# Patient Record
Sex: FEMALE | Race: WHITE | HISPANIC OR LATINO
[De-identification: names, ages, dates, MRNs, and addresses within clinical notes are randomized per-mention and may not be internally consistent; named-entity substitution may affect disease eponyms.]

---

## 2017-04-29 PROBLEM — Z00.00 ENCOUNTER FOR PREVENTIVE HEALTH EXAMINATION: Noted: 2017-04-29

## 2017-05-09 ENCOUNTER — APPOINTMENT (OUTPATIENT)
Dept: OBGYN | Facility: CLINIC | Age: 62
End: 2017-05-09

## 2017-08-13 ENCOUNTER — LABORATORY RESULT (OUTPATIENT)
Age: 62
End: 2017-08-13

## 2017-08-13 ENCOUNTER — RESULT REVIEW (OUTPATIENT)
Age: 62
End: 2017-08-13

## 2017-08-14 ENCOUNTER — APPOINTMENT (OUTPATIENT)
Dept: OBGYN | Facility: CLINIC | Age: 62
End: 2017-08-14
Payer: MEDICAID

## 2017-08-14 VITALS
HEIGHT: 62 IN | BODY MASS INDEX: 34.04 KG/M2 | DIASTOLIC BLOOD PRESSURE: 84 MMHG | WEIGHT: 185 LBS | SYSTOLIC BLOOD PRESSURE: 140 MMHG

## 2017-08-14 DIAGNOSIS — F41.9 ANXIETY DISORDER, UNSPECIFIED: ICD-10-CM

## 2017-08-14 DIAGNOSIS — I10 ESSENTIAL (PRIMARY) HYPERTENSION: ICD-10-CM

## 2017-08-14 DIAGNOSIS — F32.9 MAJOR DEPRESSIVE DISORDER, SINGLE EPISODE, UNSPECIFIED: ICD-10-CM

## 2017-08-14 DIAGNOSIS — F15.90 OTHER STIMULANT USE, UNSPECIFIED, UNCOMPLICATED: ICD-10-CM

## 2017-08-14 DIAGNOSIS — G40.909 EPILEPSY, UNSPECIFIED, NOT INTRACTABLE, W/OUT STATUS EPILEPTICUS: ICD-10-CM

## 2017-08-14 DIAGNOSIS — R23.2 FLUSHING: ICD-10-CM

## 2017-08-14 DIAGNOSIS — Z01.419 ENCOUNTER FOR GYNECOLOGICAL EXAMINATION (GENERAL) (ROUTINE) W/OUT ABNORMAL FINDINGS: ICD-10-CM

## 2017-08-14 DIAGNOSIS — Z86.79 PERSONAL HISTORY OF OTHER DISEASES OF THE CIRCULATORY SYSTEM: ICD-10-CM

## 2017-08-14 DIAGNOSIS — K29.70 GASTRITIS, UNSPECIFIED, W/OUT BLEEDING: ICD-10-CM

## 2017-08-14 PROCEDURE — 99386 PREV VISIT NEW AGE 40-64: CPT

## 2017-08-14 RX ORDER — ASPIRIN 325 MG/1
TABLET, FILM COATED ORAL
Refills: 0 | Status: ACTIVE | COMMUNITY

## 2017-08-14 RX ORDER — LACOSAMIDE 100 MG/1
100 TABLET, FILM COATED ORAL
Refills: 0 | Status: ACTIVE | COMMUNITY

## 2017-08-14 RX ORDER — PAROXETINE 7.5 MG/1
7.5 CAPSULE ORAL DAILY
Qty: 1 | Refills: 10 | Status: ACTIVE | COMMUNITY
Start: 2017-08-14 | End: 1900-01-01

## 2017-08-14 RX ORDER — DEXTROMETHORPHAN HYDROBROMIDE AND QUINIDINE SULFATE 20; 10 MG/1; MG/1
20-10 CAPSULE, GELATIN COATED ORAL
Refills: 0 | Status: ACTIVE | COMMUNITY

## 2017-08-14 RX ORDER — HYDROCHLOROTHIAZIDE 12.5 MG/1
CAPSULE ORAL
Refills: 0 | Status: ACTIVE | COMMUNITY

## 2017-08-14 RX ORDER — NIFEDIPINE 90 MG/1
90 TABLET, EXTENDED RELEASE ORAL
Refills: 0 | Status: ACTIVE | COMMUNITY

## 2017-08-14 RX ORDER — FLUTICASONE PROPIONATE 0.5 MG/G
0.05 CREAM TOPICAL
Qty: 60 | Refills: 0 | Status: ACTIVE | COMMUNITY
Start: 2017-06-12

## 2017-08-14 RX ORDER — LIDOCAINE AND PRILOCAINE 25; 25 MG/G; MG/G
2.5-2.5 CREAM TOPICAL
Qty: 30 | Refills: 0 | Status: ACTIVE | COMMUNITY
Start: 2017-05-30

## 2017-08-14 RX ORDER — AMMONIUM LACTATE 12 %
12 CREAM (GRAM) TOPICAL
Qty: 385 | Refills: 0 | Status: ACTIVE | COMMUNITY
Start: 2017-05-30

## 2017-08-28 ENCOUNTER — APPOINTMENT (OUTPATIENT)
Dept: OBGYN | Facility: CLINIC | Age: 62
End: 2017-08-28

## 2018-10-22 ENCOUNTER — INPATIENT (INPATIENT)
Facility: HOSPITAL | Age: 63
LOS: 16 days | Discharge: NURSING, REHAB OR LONG-TERM CARE | End: 2018-11-08
Attending: HOSPITALIST | Admitting: HOSPITALIST
Payer: MEDICAID

## 2018-10-22 VITALS
HEART RATE: 79 BPM | DIASTOLIC BLOOD PRESSURE: 83 MMHG | OXYGEN SATURATION: 99 % | TEMPERATURE: 98 F | SYSTOLIC BLOOD PRESSURE: 161 MMHG | RESPIRATION RATE: 18 BRPM

## 2018-10-22 DIAGNOSIS — Z90.49 ACQUIRED ABSENCE OF OTHER SPECIFIED PARTS OF DIGESTIVE TRACT: Chronic | ICD-10-CM

## 2018-10-22 NOTE — ED ADULT NURSE NOTE - CHIEF COMPLAINT QUOTE
PT C/O difficulty ambulating and unable to move right leg today. Pt [place] from [facility] [place] today. Pt has PMH 3 strokes, most recent being 3 months ago: Pt non verbal at baseline, right sided residual weakness and right sided facial droop at baseline: normally ambulatory with walker and assistance at baseline. CHETAN MD Wan in triage for stroke eval: no code stroke called.

## 2018-10-22 NOTE — ED ADULT NURSE NOTE - OBJECTIVE STATEMENT
Pt received to room 18. Presents alert straight from airport after landing from  According to friend at bedside brought pt to hospital after she was unable to move on arrival unsure of when symptoms began. Pt has right sided residual paralysis after stroke 3 months ago. History of multiple strokes in the past. Expressive aphasia at since stroke. Respirations appear even and unlabored. Will continue to monitor.

## 2018-10-22 NOTE — ED ADULT TRIAGE NOTE - CHIEF COMPLAINT QUOTE
PT C/O difficulty ambulating and unable to move right leg today. Pt arriving from [facility] [place] today. Pt has PMH 3 strokes, most recent being 3 months ago: Pt non verbal at baseline, right sided residual weakness and right sided facial droop at baseline: normally ambulatory with walker and assistance at baseline. CHETAN MD Wan in triage for stroke eval: no code stroke called.

## 2018-10-22 NOTE — ED ADULT NURSE NOTE - NSIMPLEMENTINTERV_GEN_ALL_ED
Implemented All Fall with Harm Risk Interventions:  [place] to call system. Call bell, personal items and telephone within reach. Instruct patient to call for assistance. Room bathroom lighting operational. Non-slip footwear when patient is off stretcher. Physically safe environment: no spills, clutter or unnecessary equipment. Stretcher in lowest position, wheels locked, appropriate side rails in place. Provide visual cue, wrist band, yellow gown, etc. Monitor gait and stability. Monitor for mental status changes and reorient to person, place, and time. Review medications for side effects contributing to fall risk. Reinforce activity limits and safety measures with patient and family. Provide visual clues: red socks.

## 2018-10-23 DIAGNOSIS — I63.9 CEREBRAL INFARCTION, UNSPECIFIED: ICD-10-CM

## 2018-10-23 DIAGNOSIS — I10 ESSENTIAL (PRIMARY) HYPERTENSION: ICD-10-CM

## 2018-10-23 DIAGNOSIS — R56.9 UNSPECIFIED CONVULSIONS: ICD-10-CM

## 2018-10-23 DIAGNOSIS — Z98.890 OTHER SPECIFIED POSTPROCEDURAL STATES: Chronic | ICD-10-CM

## 2018-10-23 DIAGNOSIS — Z29.9 ENCOUNTER FOR PROPHYLACTIC MEASURES, UNSPECIFIED: ICD-10-CM

## 2018-10-23 LAB
ALBUMIN SERPL ELPH-MCNC: 4.2 G/DL — SIGNIFICANT CHANGE UP (ref 3.3–5)
ALP SERPL-CCNC: 86 U/L — SIGNIFICANT CHANGE UP (ref 40–120)
ALT FLD-CCNC: 15 U/L — SIGNIFICANT CHANGE UP (ref 4–33)
APTT BLD: 27.6 SEC — SIGNIFICANT CHANGE UP (ref 27.5–37.4)
AST SERPL-CCNC: 15 U/L — SIGNIFICANT CHANGE UP (ref 4–32)
BASOPHILS # BLD AUTO: 0.05 K/UL — SIGNIFICANT CHANGE UP (ref 0–0.2)
BASOPHILS NFR BLD AUTO: 0.5 % — SIGNIFICANT CHANGE UP (ref 0–2)
BILIRUB SERPL-MCNC: 0.2 MG/DL — SIGNIFICANT CHANGE UP (ref 0.2–1.2)
BUN SERPL-MCNC: 13 MG/DL — SIGNIFICANT CHANGE UP (ref 7–23)
CALCIUM SERPL-MCNC: 9.6 MG/DL — SIGNIFICANT CHANGE UP (ref 8.4–10.5)
CHLORIDE SERPL-SCNC: 103 MMOL/L — SIGNIFICANT CHANGE UP (ref 98–107)
CO2 SERPL-SCNC: 24 MMOL/L — SIGNIFICANT CHANGE UP (ref 22–31)
CREAT SERPL-MCNC: 0.68 MG/DL — SIGNIFICANT CHANGE UP (ref 0.5–1.3)
EOSINOPHIL # BLD AUTO: 0.14 K/UL — SIGNIFICANT CHANGE UP (ref 0–0.5)
EOSINOPHIL NFR BLD AUTO: 1.3 % — SIGNIFICANT CHANGE UP (ref 0–6)
GLUCOSE SERPL-MCNC: 107 MG/DL — HIGH (ref 70–99)
HCT VFR BLD CALC: 42.1 % — SIGNIFICANT CHANGE UP (ref 34.5–45)
HGB BLD-MCNC: 13.9 G/DL — SIGNIFICANT CHANGE UP (ref 11.5–15.5)
IMM GRANULOCYTES # BLD AUTO: 0.08 # — SIGNIFICANT CHANGE UP
IMM GRANULOCYTES NFR BLD AUTO: 0.7 % — SIGNIFICANT CHANGE UP (ref 0–1.5)
INR BLD: 0.97 — SIGNIFICANT CHANGE UP (ref 0.88–1.17)
LYMPHOCYTES # BLD AUTO: 3.96 K/UL — HIGH (ref 1–3.3)
LYMPHOCYTES # BLD AUTO: 36 % — SIGNIFICANT CHANGE UP (ref 13–44)
MCHC RBC-ENTMCNC: 32.6 PG — SIGNIFICANT CHANGE UP (ref 27–34)
MCHC RBC-ENTMCNC: 33 % — SIGNIFICANT CHANGE UP (ref 32–36)
MCV RBC AUTO: 98.8 FL — SIGNIFICANT CHANGE UP (ref 80–100)
MONOCYTES # BLD AUTO: 1.08 K/UL — HIGH (ref 0–0.9)
MONOCYTES NFR BLD AUTO: 9.8 % — SIGNIFICANT CHANGE UP (ref 2–14)
NEUTROPHILS # BLD AUTO: 5.7 K/UL — SIGNIFICANT CHANGE UP (ref 1.8–7.4)
NEUTROPHILS NFR BLD AUTO: 51.7 % — SIGNIFICANT CHANGE UP (ref 43–77)
NRBC # FLD: 0 — SIGNIFICANT CHANGE UP
PLATELET # BLD AUTO: 244 K/UL — SIGNIFICANT CHANGE UP (ref 150–400)
PMV BLD: 10.9 FL — SIGNIFICANT CHANGE UP (ref 7–13)
POTASSIUM SERPL-MCNC: 4.1 MMOL/L — SIGNIFICANT CHANGE UP (ref 3.5–5.3)
POTASSIUM SERPL-SCNC: 4.1 MMOL/L — SIGNIFICANT CHANGE UP (ref 3.5–5.3)
PROT SERPL-MCNC: 7.6 G/DL — SIGNIFICANT CHANGE UP (ref 6–8.3)
PROTHROM AB SERPL-ACNC: 11.1 SEC — SIGNIFICANT CHANGE UP (ref 9.8–13.1)
RBC # BLD: 4.26 M/UL — SIGNIFICANT CHANGE UP (ref 3.8–5.2)
RBC # FLD: 11.9 % — SIGNIFICANT CHANGE UP (ref 10.3–14.5)
SODIUM SERPL-SCNC: 141 MMOL/L — SIGNIFICANT CHANGE UP (ref 135–145)
WBC # BLD: 11.01 K/UL — HIGH (ref 3.8–10.5)
WBC # FLD AUTO: 11.01 K/UL — HIGH (ref 3.8–10.5)

## 2018-10-23 PROCEDURE — 99223 1ST HOSP IP/OBS HIGH 75: CPT

## 2018-10-23 PROCEDURE — 99223 1ST HOSP IP/OBS HIGH 75: CPT | Mod: GC

## 2018-10-23 PROCEDURE — 70496 CT ANGIOGRAPHY HEAD: CPT | Mod: 26

## 2018-10-23 PROCEDURE — 70498 CT ANGIOGRAPHY NECK: CPT | Mod: 26

## 2018-10-23 RX ORDER — SPIRONOLACTONE 25 MG/1
25 TABLET, FILM COATED ORAL DAILY
Qty: 0 | Refills: 0 | Status: DISCONTINUED | OUTPATIENT
Start: 2018-10-23 | End: 2018-11-08

## 2018-10-23 RX ORDER — HEPARIN SODIUM 5000 [USP'U]/ML
5000 INJECTION INTRAVENOUS; SUBCUTANEOUS EVERY 8 HOURS
Qty: 0 | Refills: 0 | Status: DISCONTINUED | OUTPATIENT
Start: 2018-10-23 | End: 2018-11-08

## 2018-10-23 RX ORDER — NIFEDIPINE 30 MG
90 TABLET, EXTENDED RELEASE 24 HR ORAL DAILY
Qty: 0 | Refills: 0 | Status: DISCONTINUED | OUTPATIENT
Start: 2018-10-23 | End: 2018-10-23

## 2018-10-23 RX ORDER — HYDROCHLOROTHIAZIDE 25 MG
25 TABLET ORAL DAILY
Qty: 0 | Refills: 0 | Status: DISCONTINUED | OUTPATIENT
Start: 2018-10-23 | End: 2018-10-23

## 2018-10-23 RX ORDER — SPIRONOLACTONE 25 MG/1
25 TABLET, FILM COATED ORAL DAILY
Qty: 0 | Refills: 0 | Status: DISCONTINUED | OUTPATIENT
Start: 2018-10-23 | End: 2018-10-23

## 2018-10-23 RX ORDER — LACOSAMIDE 50 MG/1
100 TABLET ORAL
Qty: 0 | Refills: 0 | Status: DISCONTINUED | OUTPATIENT
Start: 2018-10-23 | End: 2018-10-25

## 2018-10-23 RX ORDER — SODIUM CHLORIDE 9 MG/ML
1000 INJECTION, SOLUTION INTRAVENOUS
Qty: 0 | Refills: 0 | Status: DISCONTINUED | OUTPATIENT
Start: 2018-10-23 | End: 2018-10-24

## 2018-10-23 RX ORDER — NIFEDIPINE 30 MG
90 TABLET, EXTENDED RELEASE 24 HR ORAL DAILY
Qty: 0 | Refills: 0 | Status: DISCONTINUED | OUTPATIENT
Start: 2018-10-23 | End: 2018-11-07

## 2018-10-23 RX ORDER — HYDROCHLOROTHIAZIDE 25 MG
25 TABLET ORAL DAILY
Qty: 0 | Refills: 0 | Status: DISCONTINUED | OUTPATIENT
Start: 2018-10-23 | End: 2018-11-01

## 2018-10-23 RX ADMIN — LACOSAMIDE 100 MILLIGRAM(S): 50 TABLET ORAL at 19:54

## 2018-10-23 RX ADMIN — SODIUM CHLORIDE 100 MILLILITER(S): 9 INJECTION, SOLUTION INTRAVENOUS at 21:29

## 2018-10-23 RX ADMIN — HEPARIN SODIUM 5000 UNIT(S): 5000 INJECTION INTRAVENOUS; SUBCUTANEOUS at 21:29

## 2018-10-23 RX ADMIN — HEPARIN SODIUM 5000 UNIT(S): 5000 INJECTION INTRAVENOUS; SUBCUTANEOUS at 13:19

## 2018-10-23 RX ADMIN — SODIUM CHLORIDE 100 MILLILITER(S): 9 INJECTION, SOLUTION INTRAVENOUS at 10:27

## 2018-10-23 NOTE — H&P ADULT - PROBLEM SELECTOR PLAN 1
subacute stroke with extensive left carotid dz.   Neurology following, f/u final recs  TTE  MRA following confirmation of compatibility of aneurysm clip with MR  check lipid panel, HbA1c  PT, swallow evaluation.  D5W while awaiting swallow eval. subacute stroke with extensive left carotid dz.   Neurology following, f/u final recs  MRA following confirmation of compatibility of aneurysm clip with MR  check lipid panel, HbA1c  PT, swallow evaluation.  D5W while awaiting swallow eval.

## 2018-10-23 NOTE — PHYSICAL THERAPY INITIAL EVALUATION ADULT - GENERAL OBSERVATIONS, REHAB EVAL
Patient found supine on stretcher in SURGE unit in NAD; +IV; as per ADINA Ruelas, patient with aphasia; non verbal; Icelandic speaking; responds to visual commands and simple commands in Icelandic; decreased coordination t/o

## 2018-10-23 NOTE — H&P ADULT - PROBLEM SELECTOR PLAN 3
h/o seizure? s/p ICH, maybe used as seziure prophylaxis  will need to confirm with Dr. Winkler and pharmacy.  c/w home seizure medication.

## 2018-10-23 NOTE — CONSULT NOTE ADULT - SUBJECTIVE AND OBJECTIVE BOX
patient is aphasic - unable to use      HPI:    63y f w PMHx strokes in the past p/w R sided paralysis s/p stroke. Pt had several stroke 15yrs ago and was apparently diagnosed w/ a brain aneurysm, which was stable for many years. She has been out of the country for the last year and 3 months ago had another stroke, which left her paralysed on the R upper and lower extremities and with R sided facial paralysis. No left sided deficits. [place] the [place] and her daughter picked her up from airport, not expecting the extent of neurological deficits. The pt was immediately brought to the ED by her daughter  for evaluation, as she did not know if additional medical care would be required and was not equipped at home to deal with a paralyzed guest.       MEDICATIONS  (STANDING):    MEDICATIONS  (PRN):      PAST MEDICAL & SURGICAL HISTORY:  S/P cholecystectomy      FAMILY HISTORY:      Allergies    No Known Allergies    Intolerances          SHx - No smoking, No ETOH, No drug abuse      Review of Systems:  CONSTITUTIONAL:  No weight loss, fever, chills, weakness or fatigue.  HEENT:  Eyes:  No visual loss, blurred vision, double vision or yellow sclerae. Ears, Nose, Throat:  No hearing loss, sneezing, congestion, runny nose or sore throat.  SKIN:  No rash or itching.  CARDIOVASCULAR:  No chest pain, chest pressure or chest discomfort. No palpitations or edema.  RESPIRATORY:  No shortness of breath, cough or sputum.  GASTROINTESTINAL:  No anorexia, nausea, vomiting or diarrhea. No abdominal pain or blood.  GENITOURINARY:  NO Burning on urination.   NEUROLOGICAL: See HPI  MUSCULOSKELETAL:  No muscle, back pain, joint pain or stiffness.  HEMATOLOGIC:  No anemia, bleeding or bruising.  LYMPHATICS:  No enlarged nodes. No history of splenectomy.  PSYCHIATRIC:  No history of depression or anxiety.  ENDOCRINOLOGIC:  No reports of sweating, cold or heat intolerance. No polyuria or polydipsia.  ALLERGIES:  No history of asthma, hives, eczema or rhinitis.        Vital Signs Last 24 Hrs  T(C): 36.8 (22 Oct 2018 23:35), Max: 36.8 (22 Oct 2018 23:35)  T(F): 98.2 (22 Oct 2018 23:35), Max: 98.2 (22 Oct 2018 23:35)  HR: 60 (23 Oct 2018 05:08) (57 - 79)  BP: 145/60 (23 Oct 2018 05:08) (145/60 - 167/70)  BP(mean): --  RR: 16 (23 Oct 2018 05:08) (16 - 18)  SpO2: 100% (23 Oct 2018 05:08) (98% - 100%)    General Exam:   General appearance: No acute distress                   Neurological Exam: limited     Mental Status: alert  awake, minimal verbal output - not making sense, following commands   Cranial Nerves: PERRL, EOMI, CN V1-3 intact to light touch and pinprick.  mild facial asymmetry- right , Tongue midline.    Motor:             Strength: 0/5 on right UE and RLE, 5/5 in LUE and LLE   Tremor: No resting, postural or action tremor.  No myoclonus.  Sensation: intact to light touch on left, intact on right but not reliable   Gait: deferred     Other:    10-22    141  |  103  |  13  ----------------------------<  107<H>  4.1   |  24  |  0.68    Ca    9.6      22 Oct 2018 23:30    TPro  7.6  /  Alb  4.2  /  TBili  0.2  /  DBili  x   /  AST  15  /  ALT  15  /  AlkPhos  86  10-22    10-22    141  |  103  |  13  ----------------------------<  107<H>  4.1   |  24  |  0.68    Ca    9.6      22 Oct 2018 23:30    TPro  7.6  /  Alb  4.2  /  TBili  0.2  /  DBili  x   /  AST  15  /  ALT  15  /  AlkPhos  86  10-22                          13.9   11.01 )-----------( 244      ( 22 Oct 2018 23:30 )             42.1       Radiology    CT head     < from: CT Angio Head w/ IV Cont (10.23.18 @ 02:35) >  CT brain: Post right pterional craniotomy with aneurysm clip in the right   parasellar region. Encephalomalacia in the right inferior frontal and   temporal lobes. Wide region of encephalomalacia in the left frontal lobe   and left basal ganglia likely reflective of an old infarct.  Small chronic appearing left parietal cortical infarct. No gross acute   intracranial hemorrhage or significant mass effect.    CT angiography neck: Completely occluded left internal carotid artery. No   significant stenosis of the cervical right internal carotid artery based   on NASCET criteria. Patent cervical vertebral arteries.    CT angiography brain: Reconstitution of the left internal carotid artery   at the cavernous and supraclinoid segments with trickle flow. Occluded   left anterior cerebral artery. Patent left middle cerebral artery M1 and   M2 inferior division. Occlusion of the superior division of the left   middle cerebral artery. Several poorly evaluated vessels secondary to   streak artifact from the aneurysm clip. MRA may be considered for further   evaluation.    < end of copied text >

## 2018-10-23 NOTE — H&P ADULT - NSHPPHYSICALEXAM_GEN_ALL_CORE
T(C): 36.8 (10-22-18 @ 23:35), Max: 36.8 (10-22-18 @ 23:35)  HR: 60 (10-23-18 @ 05:08) (57 - 79)  BP: 145/60 (10-23-18 @ 05:08) (145/60 - 167/70)  RR: 16 (10-23-18 @ 05:08) (16 - 18)  SpO2: 100% (10-23-18 @ 05:08) (98% - 100%)    GENERAL: no apparent distress, on room air  HEAD:  Atraumatic, Normocephalic  EYES: EOMI, PERRLA, conjunctiva and sclera clear b/l  CHEST/LUNG: Clear to auscultation bilaterally; No wheezing or crackles  HEART: Regular rate and rhythm; No murmurs, rubs, or gallops  ABDOMEN: Soft, Nontender, Nondistended; Bowel sounds present  EXTREMITIES:  pulses equal bilaterally; +pitting edema of RLE; non-tender on plapation.  PSYCH: normal affect, calm demeanor  NEUROLOGY: AAOX3, no sensory or motor deficits, 5/5 muscle strength equal in left extremities; 0/5 over right extremities, CN 2-12 grossly intact except for right lower facial droop, aphasia (vocal cord weakness?)  SKIN: No rashes or lesions

## 2018-10-23 NOTE — ED PROVIDER NOTE - ATTENDING CONTRIBUTION TO CARE
63F brought to ED by friends due to R hemiparesis and aphasia. Pt has h/o HTN ? p[rior sz and ? prior CVA. Was visiting [place] when she had a stroke 3 months ago. She finally flew back home to [place] today, was met at airport by friends who noted severe R hemiparesis and aphasia and brought her to ER stating they cannot care for her. On exam: Pt non verbal but able to follow commands. lungs and heart normal. Dense R hemiparesis with 1/5 weakness of R arm and leg with increased tone and reflexes, Some R facial droop. IMP: Stroke with dense R hemiparesis and aphasia likely months old, but not able to be cared for at home. d/w medicine and neuro- will need labs EKG CT head and CTA and admission to medicine for ultimate placement

## 2018-10-23 NOTE — PHYSICAL THERAPY INITIAL EVALUATION ADULT - PLANNED THERAPY INTERVENTIONS, PT EVAL
balance training/ROM/gait training/strengthening/bed mobility training/transfer training/Patient left supine on stretcher in NAD; +siderails; ADINA Ruelas aware

## 2018-10-23 NOTE — ED PROVIDER NOTE - MEDICAL DECISION MAKING DETAILS
Pt w/ chronic stroke symptoms now presenting unchanged; daughter requesting medical help and establishing neurologic f/u. No evidence of acute stroke symptoms, no code stroke called.

## 2018-10-23 NOTE — CONSULT NOTE ADULT - ASSESSMENT
- unable to use  as patient is aphasic.     63y f w PMHx strokes in the past p/w R sided paralysis s/p stroke. Pt had several stroke 15yrs ago and was apparently diagnosed w/ a brain aneurysm, which was stable for many years. She has been out of the country for the last year and 3 months ago had another stroke, which left her paralysed on the R upper and lower extremities and with R sided facial paralysis. No left sided deficits. [place] and her daughter picked her up from airport, not expecting the extent of neurological deficits. The pt was immediately brought to the ED by her daughter  for evaluation, as she did not know if additional medical care would be required and was not equipped at home to deal with a paralyzed guest. Neurological examination showed right facial asymmetry, right hemiplegia, aphasia. CT head showed chronic old infarcts in bilateral hemisphere.     Impression     b/l chronic infarct with encephalomalacia     Plan     not sure if clip is MRI compatible     (note is incomplete) - unable to use  as patient is aphasic.     63y f w PMHx strokes in the past p/w R sided paralysis s/p stroke. Pt had several stroke 15yrs ago and was apparently diagnosed w/ a brain aneurysm, which was stable for many years. She has been out of the country for the last year and 3 months ago had another stroke, which left her paralysed on the R upper and lower extremities and with R sided facial paralysis. No left sided deficits. [place] and her daughter picked her up from airport, not expecting the extent of neurological deficits. The pt was immediately brought to the ED by her daughter  for evaluation, as she did not know if additional medical care would be required and was not equipped at home to deal with a paralyzed guest. Neurological examination showed right facial asymmetry, right hemiplegia, aphasia. CT head showed chronic old infarcts in bilateral hemisphere.     Impression     b/l chronic infarct with encephalomalacia     Plan   Aneurysm clip may be MRI incompatible; need more information  -Aspirin 81 mg daily  -DVT prophylaxis  -CTA head/enck  -TTE with bubble study  -telemetry - unable to use  as patient is aphasic.     63y f w PMHx strokes in the past p/w R sided paralysis s/p stroke. Pt had several stroke 15yrs ago and was apparently diagnosed w/ a brain aneurysm, which was stable for many years. She has been out of the country for the last year and 3 months ago had another stroke, which left her paralysed on the R upper and lower extremities and with R sided facial paralysis. No left sided deficits. [place] and her daughter picked her up from airport, not expecting the extent of neurological deficits. The pt was immediately brought to the ED by her daughter  for evaluation, as she did not know if additional medical care would be required and was not equipped at home to deal with a paralyzed guest. Neurological examination showed right facial asymmetry, right hemiplegia, aphasia. CT head showed chronic old infarcts in bilateral hemisphere.     Impression     b/l chronic infarct with encephalomalacia     Plan   Aneurysm clip may be MRI incompatible; need more information  -Aspirin 81 mg daily  -DVT prophylaxis  -CTA head/enck  -TTE with bubble study  -atorvastatin 80 mg  -telemetry

## 2018-10-23 NOTE — PHYSICAL THERAPY INITIAL EVALUATION ADULT - PERTINENT HX OF CURRENT PROBLEM, REHAB EVAL
63F with PMHx HTN, ?Seizure, hemorrhagic stroke due to aneurysm s/p craniotomy presented with R sided weakness. As per chart, daughter Yamileth reports, [place], was [place] family in  for the past few months. 3 months ago, while aboard, patient suffered a stroke. Patient was found unconscious at home and when she woke up, she could not move the right side of body. CT head/neck old crani, left side infarct/princess enceph, complete occl LICA, occl left VINNY/sup left MCA.

## 2018-10-23 NOTE — H&P ADULT - HISTORY OF PRESENT ILLNESS
63F with PMHx HTN, ?Seizure, hemorrhagic stroke due to aneurysm s/p craniotomy presented with R sided weakness. Patient was non-verbal, information gathered from Daughter Yamileth. Per daughter, [place], was visiting family in  for the past few months. 3 months ago, while aboard, patient suffered from a stroke. Patient was found unconscious at home and when she woke up, she could not move the right side of her body. She brought her mother in for evaluation straight from the airport for evaluation, because she does not understand what happened and cannot take care of patient at home. Per Daughter, patient had a aneurysm rupture 12 years ago, with a prolonged ICU stay at the time. Patient had craniotomy with metal plate placed since. No residual deficit after the event. Patient had been following up with Dr. Winkler (neurologist in [facility]) since. Prior to this stroke, patient had been ambulatory and functional with full ADL. Per patient (via ), she had no fever, chill, chest pain, SOB, palpitation, abdominal pain or urinary complaints.    In ED, patient was afebrile and VSS. Labs were unremarkable. CT head and CTA head/neck showed old craniotomy, left sided infarct and bilateral encephalomalacias. Completely occluded left internal carotid artery.  Occluded left anterior cerebral artery. Occlusion of the superior division of the left middle cerebral artery. Neurology was consulted

## 2018-10-23 NOTE — H&P ADULT - NSHPREVIEWOFSYSTEMS_GEN_ALL_CORE
CONSTITUTIONAL: No fever, chills, night sweats, weight loss, or fatigue  EYES: No eye pain, blurry vision, or discharge  ENMT:  No difficulty hearing, tinnitus, vertigo; No sinus or throat pain  NECK: No pain or stiffness  BREASTS: No pain, masses, or nipple discharge  RESPIRATORY: No cough, wheezing, chills or hemoptysis; No shortness of breath  CARDIOVASCULAR: No chest pain, palpitations, dizziness, or leg swelling  GASTROINTESTINAL: No abdominal or epigastric pain. No nausea, vomiting, or hematemesis; No diarrhea or constipation. No melena or hematochezia.  GENITOURINARY: No dysuria, frequency, hematuria, or incontinence  NEUROLOGICAL: Cannot move right side of body; No headaches  SKIN: No itching, burning, rashes, or lesions   LYMPH NODES: No enlarged glands  ENDOCRINE: No heat or cold intolerance; No hair loss  MUSCULOSKELETAL: No joint pain or swelling; No muscle, back, or extremity pain

## 2018-10-23 NOTE — PHYSICAL THERAPY INITIAL EVALUATION ADULT - ACTIVE RANGE OF MOTION EXAMINATION, REHAB EVAL
Left LE Active ROM was WFL (within functional limits)/right UE and LE no AROM noted; PROM limited t/o at end range of motion t/o/Left UE Active ROM was WFL (within functional limits)

## 2018-10-23 NOTE — H&P ADULT - ASSESSMENT
63F with h/o HTN, hemorrhagic cva s/p craniotomy presented with right sided hemiplegia, aphasia. CTA with extensive left carotid artery dz. Admit for PT evaluation and rehab placement.

## 2018-10-23 NOTE — ED PROVIDER NOTE - OBJECTIVE STATEMENT
63y f w PMHx strokes in the past p/w R sided paralysis s/p stroke. Pt had several stroke 15yrs ago and was apparently diagnosed w/ a brain aneurysm, which was stable for many years. She has been out of the country for the last year and 3 months ago had another stroke, which left her paralysed on the R upper and lower extremities and with R sided facial paralysis. No left sided deficits. [place] and her daughter picked her up from airport, not expecting the extent of neurological deficits. The pt was immediately brought to the ED by her daughter  for evaluation, as she did not know if additional medical care would be required and was not equipped at home to deal with a paralyzed guest. Pt is otherwise A&Ox3

## 2018-10-24 LAB
ALBUMIN SERPL ELPH-MCNC: 3.6 G/DL — SIGNIFICANT CHANGE UP (ref 3.3–5)
ALP SERPL-CCNC: 83 U/L — SIGNIFICANT CHANGE UP (ref 40–120)
ALT FLD-CCNC: 13 U/L — SIGNIFICANT CHANGE UP (ref 4–33)
AST SERPL-CCNC: 13 U/L — SIGNIFICANT CHANGE UP (ref 4–32)
BASOPHILS # BLD AUTO: 0.03 K/UL — SIGNIFICANT CHANGE UP (ref 0–0.2)
BASOPHILS NFR BLD AUTO: 0.3 % — SIGNIFICANT CHANGE UP (ref 0–2)
BILIRUB SERPL-MCNC: 0.6 MG/DL — SIGNIFICANT CHANGE UP (ref 0.2–1.2)
BUN SERPL-MCNC: 10 MG/DL — SIGNIFICANT CHANGE UP (ref 7–23)
CALCIUM SERPL-MCNC: 9.3 MG/DL — SIGNIFICANT CHANGE UP (ref 8.4–10.5)
CHLORIDE SERPL-SCNC: 104 MMOL/L — SIGNIFICANT CHANGE UP (ref 98–107)
CHOLEST SERPL-MCNC: 163 MG/DL — SIGNIFICANT CHANGE UP (ref 120–199)
CO2 SERPL-SCNC: 22 MMOL/L — SIGNIFICANT CHANGE UP (ref 22–31)
CREAT SERPL-MCNC: 0.73 MG/DL — SIGNIFICANT CHANGE UP (ref 0.5–1.3)
EOSINOPHIL # BLD AUTO: 0.14 K/UL — SIGNIFICANT CHANGE UP (ref 0–0.5)
EOSINOPHIL NFR BLD AUTO: 1.5 % — SIGNIFICANT CHANGE UP (ref 0–6)
GLUCOSE SERPL-MCNC: 96 MG/DL — SIGNIFICANT CHANGE UP (ref 70–99)
HBA1C BLD-MCNC: 5.7 % — HIGH (ref 4–5.6)
HCT VFR BLD CALC: 39.5 % — SIGNIFICANT CHANGE UP (ref 34.5–45)
HDLC SERPL-MCNC: 34 MG/DL — LOW (ref 45–65)
HGB BLD-MCNC: 13.2 G/DL — SIGNIFICANT CHANGE UP (ref 11.5–15.5)
IMM GRANULOCYTES # BLD AUTO: 0.03 # — SIGNIFICANT CHANGE UP
IMM GRANULOCYTES NFR BLD AUTO: 0.3 % — SIGNIFICANT CHANGE UP (ref 0–1.5)
LIPID PNL WITH DIRECT LDL SERPL: 116 MG/DL — SIGNIFICANT CHANGE UP
LYMPHOCYTES # BLD AUTO: 3.16 K/UL — SIGNIFICANT CHANGE UP (ref 1–3.3)
LYMPHOCYTES # BLD AUTO: 34.6 % — SIGNIFICANT CHANGE UP (ref 13–44)
MAGNESIUM SERPL-MCNC: 2.1 MG/DL — SIGNIFICANT CHANGE UP (ref 1.6–2.6)
MCHC RBC-ENTMCNC: 33.3 PG — SIGNIFICANT CHANGE UP (ref 27–34)
MCHC RBC-ENTMCNC: 33.4 % — SIGNIFICANT CHANGE UP (ref 32–36)
MCV RBC AUTO: 99.7 FL — SIGNIFICANT CHANGE UP (ref 80–100)
MONOCYTES # BLD AUTO: 0.94 K/UL — HIGH (ref 0–0.9)
MONOCYTES NFR BLD AUTO: 10.3 % — SIGNIFICANT CHANGE UP (ref 2–14)
NEUTROPHILS # BLD AUTO: 4.82 K/UL — SIGNIFICANT CHANGE UP (ref 1.8–7.4)
NEUTROPHILS NFR BLD AUTO: 53 % — SIGNIFICANT CHANGE UP (ref 43–77)
NRBC # FLD: 0 — SIGNIFICANT CHANGE UP
PHOSPHATE SERPL-MCNC: 4.4 MG/DL — SIGNIFICANT CHANGE UP (ref 2.5–4.5)
PLATELET # BLD AUTO: 208 K/UL — SIGNIFICANT CHANGE UP (ref 150–400)
PMV BLD: 11 FL — SIGNIFICANT CHANGE UP (ref 7–13)
POTASSIUM SERPL-MCNC: 3.8 MMOL/L — SIGNIFICANT CHANGE UP (ref 3.5–5.3)
POTASSIUM SERPL-SCNC: 3.8 MMOL/L — SIGNIFICANT CHANGE UP (ref 3.5–5.3)
PROT SERPL-MCNC: 6.7 G/DL — SIGNIFICANT CHANGE UP (ref 6–8.3)
RBC # BLD: 3.96 M/UL — SIGNIFICANT CHANGE UP (ref 3.8–5.2)
RBC # FLD: 11.8 % — SIGNIFICANT CHANGE UP (ref 10.3–14.5)
SODIUM SERPL-SCNC: 140 MMOL/L — SIGNIFICANT CHANGE UP (ref 135–145)
TRIGL SERPL-MCNC: 115 MG/DL — SIGNIFICANT CHANGE UP (ref 10–149)
TSH SERPL-MCNC: 2.2 UIU/ML — SIGNIFICANT CHANGE UP (ref 0.27–4.2)
WBC # BLD: 9.12 K/UL — SIGNIFICANT CHANGE UP (ref 3.8–10.5)
WBC # FLD AUTO: 9.12 K/UL — SIGNIFICANT CHANGE UP (ref 3.8–10.5)

## 2018-10-24 PROCEDURE — 99233 SBSQ HOSP IP/OBS HIGH 50: CPT

## 2018-10-24 PROCEDURE — 93880 EXTRACRANIAL BILAT STUDY: CPT | Mod: 26

## 2018-10-24 RX ORDER — ATORVASTATIN CALCIUM 80 MG/1
80 TABLET, FILM COATED ORAL AT BEDTIME
Qty: 0 | Refills: 0 | Status: DISCONTINUED | OUTPATIENT
Start: 2018-10-24 | End: 2018-11-08

## 2018-10-24 RX ORDER — ASPIRIN/CALCIUM CARB/MAGNESIUM 324 MG
81 TABLET ORAL DAILY
Qty: 0 | Refills: 0 | Status: DISCONTINUED | OUTPATIENT
Start: 2018-10-24 | End: 2018-11-08

## 2018-10-24 RX ADMIN — HEPARIN SODIUM 5000 UNIT(S): 5000 INJECTION INTRAVENOUS; SUBCUTANEOUS at 07:50

## 2018-10-24 RX ADMIN — SODIUM CHLORIDE 100 MILLILITER(S): 9 INJECTION, SOLUTION INTRAVENOUS at 07:52

## 2018-10-24 RX ADMIN — HEPARIN SODIUM 5000 UNIT(S): 5000 INJECTION INTRAVENOUS; SUBCUTANEOUS at 22:00

## 2018-10-24 RX ADMIN — ATORVASTATIN CALCIUM 80 MILLIGRAM(S): 80 TABLET, FILM COATED ORAL at 22:00

## 2018-10-24 RX ADMIN — Medication 81 MILLIGRAM(S): at 13:26

## 2018-10-24 RX ADMIN — LACOSAMIDE 100 MILLIGRAM(S): 50 TABLET ORAL at 17:50

## 2018-10-24 RX ADMIN — HEPARIN SODIUM 5000 UNIT(S): 5000 INJECTION INTRAVENOUS; SUBCUTANEOUS at 14:47

## 2018-10-24 NOTE — SWALLOW BEDSIDE ASSESSMENT ADULT - ORAL PHASE
Delayed anterior-posterior transfer Suspect premature loss over base of tongue given rapid AP transport

## 2018-10-24 NOTE — CONSULT NOTE ADULT - ASSESSMENT
64 yo woman with a hx of HTN, possible seizure, hemorrhagic stroke due to aneurysm rupture 12 years ago s/p clipping and craniotomy, who presents with completed left-sided CVA, right-sided hemiplegia, and complete occlusion of left ICA on CTA. Unknown if patient's previous clipping and craniotomy hardware are MRI safe, so patient cannot undergo MRA. Recommend checking carotid duplex to confirm complete left ICA occlusion. If this is indeed the case, she would not benefit from any intervention as she has already developed collateral circulation.    -recommend carotid duplex to confirm complete L ICA occlusion  -cont medical management of carotid disease: ASA, statin, BP and glucose control  -vascular surgery will continue to follow    Patient discussed with Dr. rPatt  [id number] 64 yo woman with a hx of HTN, possible seizure, hemorrhagic stroke due to aneurysm rupture 12 years ago s/p clipping and craniotomy, who presents with completed left-sided CVA, right-sided hemiplegia, and complete occlusion of left ICA on CTA. Unknown if patient's previous clipping and craniotomy hardware are MRI safe, so patient cannot undergo MRA. VA Duplex confirming complete L ICA occlusion. As such, she would not benefit from any intervention as she has already developed collateral circulation.    -no vascular surgical intervention indicated  -cont medical management of carotid disease: ASA, statin, BP and glucose control  -please reconsult should additional concerns arise    Patient discussed with Dr. Pratt  [id number]

## 2018-10-24 NOTE — SWALLOW BEDSIDE ASSESSMENT ADULT - COMMENTS
Patient was received awake, alert and non-verbal. She inconsistently utilized gestures (i.e. head nod) to respond to basic yes/no questions Patient is a 63 year-old female with PMHx of HTN, hemorrhagic cva, brain aneurysm s/p craniotomy, metal plate clip (~12 years ago), who presented with subacute / chronic CVA with right sided hemiplegia and aphasia. CTA with extensive left carotid artery disease.     Patient was received awake, alert and non-verbal. She was noted to utilize gestures (i.e. head nod) to respond to basic yes/no questions, and followed simple one-step commands with clinician repetition and models. Recommendations discussed with RN and ADS. Patient is a 63 year-old female with PMHx of HTN, hemorrhagic cva, brain aneurysm s/p craniotomy, metal plate clip (~12 years ago), who presented with subacute / chronic CVA with right sided hemiplegia and aphasia. CTA with extensive left carotid artery disease.     Patient was received awake, alert and non-verbal this AM. She was noted to utilize gestures (i.e. head nod) to respond to basic yes/no questions, and followed simple one-step commands with clinician repetition and models. Recommendations discussed with RN and ADS.

## 2018-10-24 NOTE — CONSULT NOTE ADULT - SUBJECTIVE AND OBJECTIVE BOX
CONSULT RESULT - VASCULAR SURGERY    Consulting attending: Dr. Pratt    HPI:  62 yo woman with a hx of HTN, possible seizure, hemorrhagic stroke due to aneurysm rupture 12 years ago s/p clipping and craniotomy, who presents with right-sided hemiplegia. The patient just arrived from the [place] where she had been for 3 months. Unknown chronicity of hemiplegia. The patient is aphasic and unable to participate in interview.    PAST MEDICAL HISTORY:  Cerebral aneurysm rupture  Aneurysm  Hemorrhagic stroke  Seizure  Hypertension, unspecified type      PAST SURGICAL HISTORY:  H/O craniotomy  S/P cholecystectomy      MEDICATIONS:  aspirin enteric coated 81 milliGRAM(s) Oral daily  atorvastatin 80 milliGRAM(s) Oral at bedtime  heparin  Injectable 5000 Unit(s) SubCutaneous every 8 hours  hydrochlorothiazide 25 milliGRAM(s) Oral daily  lacosamide 100 milliGRAM(s) Oral two times a day  NIFEdipine XL 90 milliGRAM(s) Oral daily  spironolactone 25 milliGRAM(s) Oral daily      ALLERGIES:  No Known Allergies      VITALS & I/Os:  Vital Signs Last 24 Hrs  T(C): 37.1 (24 Oct 2018 06:29), Max: 37.1 (24 Oct 2018 06:29)  T(F): 98.8 (24 Oct 2018 06:29), Max: 98.8 (24 Oct 2018 06:29)  HR: 62 (24 Oct 2018 06:29) (61 - 62)  BP: 137/84 (24 Oct 2018 06:29) (135/81 - 145/80)  BP(mean): --  RR: 18 (24 Oct 2018 06:29) (18 - 18)  SpO2: 99% (24 Oct 2018 06:29) (99% - 100%)    I&O's Summary    23 Oct 2018 07:01  -  24 Oct 2018 07:00  --------------------------------------------------------  IN: 700 mL / OUT: 0 mL / NET: 700 mL    24 Oct 2018 07:01  -  24 Oct 2018 14:22  --------------------------------------------------------  IN: 1200 mL / OUT: 0 mL / NET: 1200 mL        PHYSICAL EXAM:  GEN: NAD, resting quietly  HEENT: NCAT, left facial droop  NEURO: alert, aphasic. Strength 0/5 in RUE and RLE. Strength 5/5 in LUE and LLE  PULM: symmetric chest rise bilaterally, no increased WOB  CV: regular rate, peripheral pulses intact  ABD: soft, NTND  EXTR: no cyanosis or edema    LABS:                        13.2   9.12  )-----------( 208      ( 24 Oct 2018 06:19 )             39.5     10-24    140  |  104  |  10  ----------------------------<  96  3.8   |  22  |  0.73    Ca    9.3      24 Oct 2018 06:19  Phos  4.4     10-24  Mg     2.1     10-24    TPro  6.7  /  Alb  3.6  /  TBili  0.6  /  DBili  x   /  AST  13  /  ALT  13  /  AlkPhos  83  10-24    Lactate:    PT/INR - ( 22 Oct 2018 23:33 )   PT: 11.1 SEC;   INR: 0.97          PTT - ( 22 Oct 2018 23:33 )  PTT:27.6 SEC      IMAGING:  CT Angio Head and Neck (10/23):  CT brain: Post right pterional craniotomy with aneurysm clip in the right   parasellar region. Encephalomalacia in the right inferior frontal and   temporal lobes. Wide region of encephalomalacia in the left frontal lobe   and left basal ganglia likely reflective of an old infarct.  Small chronic appearing left parietal cortical infarct. No gross acute   intracranial hemorrhage or significant mass effect.    CT angiography neck: Completely occluded left internal carotid artery. No   significant stenosis of the cervical right internal carotid artery based   on NASCET criteria. Patent cervical vertebral arteries.    CT angiography brain: Reconstitution of the left internal carotid artery   at the cavernous and supraclinoid segments with trickle flow. Occluded   left anterior cerebral artery. Patent left middle cerebral artery M1 and   M2 inferior division. Occlusion of the superior division of the left   middle cerebral artery. Several poorly evaluated vessels secondary to   streak artifact from the aneurysm clip. MRA may be considered for further   evaluation. CONSULT RESULT - VASCULAR SURGERY    Consulting attending: Dr. Pratt    HPI:  64 yo woman with a hx of HTN, possible seizure, hemorrhagic stroke due to aneurysm rupture 12 years ago s/p clipping and craniotomy, who presents with right-sided hemiplegia. The patient just arrived from the [place] where she had been for 3 months. Unknown chronicity of hemiplegia. The patient is aphasic and unable to participate in interview.    PAST MEDICAL HISTORY:  Cerebral aneurysm rupture  Aneurysm  Hemorrhagic stroke  Seizure  Hypertension, unspecified type      PAST SURGICAL HISTORY:  H/O craniotomy  S/P cholecystectomy      MEDICATIONS:  aspirin enteric coated 81 milliGRAM(s) Oral daily  atorvastatin 80 milliGRAM(s) Oral at bedtime  heparin  Injectable 5000 Unit(s) SubCutaneous every 8 hours  hydrochlorothiazide 25 milliGRAM(s) Oral daily  lacosamide 100 milliGRAM(s) Oral two times a day  NIFEdipine XL 90 milliGRAM(s) Oral daily  spironolactone 25 milliGRAM(s) Oral daily      ALLERGIES:  No Known Allergies      VITALS & I/Os:  Vital Signs Last 24 Hrs  T(C): 37.1 (24 Oct 2018 06:29), Max: 37.1 (24 Oct 2018 06:29)  T(F): 98.8 (24 Oct 2018 06:29), Max: 98.8 (24 Oct 2018 06:29)  HR: 62 (24 Oct 2018 06:29) (61 - 62)  BP: 137/84 (24 Oct 2018 06:29) (135/81 - 145/80)  BP(mean): --  RR: 18 (24 Oct 2018 06:29) (18 - 18)  SpO2: 99% (24 Oct 2018 06:29) (99% - 100%)    I&O's Summary    23 Oct 2018 07:01  -  24 Oct 2018 07:00  --------------------------------------------------------  IN: 700 mL / OUT: 0 mL / NET: 700 mL    24 Oct 2018 07:01  -  24 Oct 2018 14:22  --------------------------------------------------------  IN: 1200 mL / OUT: 0 mL / NET: 1200 mL        PHYSICAL EXAM:  GEN: NAD, resting quietly  HEENT: NCAT, left facial droop  NEURO: alert, aphasic. Strength 0/5 in RUE and RLE. Strength 5/5 in LUE and LLE  PULM: symmetric chest rise bilaterally, no increased WOB  CV: regular rate, peripheral pulses intact  ABD: soft, NTND  EXTR: no cyanosis or edema    LABS:                        13.2   9.12  )-----------( 208      ( 24 Oct 2018 06:19 )             39.5     10-24    140  |  104  |  10  ----------------------------<  96  3.8   |  22  |  0.73    Ca    9.3      24 Oct 2018 06:19  Phos  4.4     10-24  Mg     2.1     10-24    TPro  6.7  /  Alb  3.6  /  TBili  0.6  /  DBili  x   /  AST  13  /  ALT  13  /  AlkPhos  83  10-24    Lactate:    PT/INR - ( 22 Oct 2018 23:33 )   PT: 11.1 SEC;   INR: 0.97          PTT - ( 22 Oct 2018 23:33 )  PTT:27.6 SEC      IMAGING:  CT Angio Head and Neck (10/23):  CT brain: Post right pterional craniotomy with aneurysm clip in the right   parasellar region. Encephalomalacia in the right inferior frontal and   temporal lobes. Wide region of encephalomalacia in the left frontal lobe   and left basal ganglia likely reflective of an old infarct.  Small chronic appearing left parietal cortical infarct. No gross acute   intracranial hemorrhage or significant mass effect.    CT angiography neck: Completely occluded left internal carotid artery. No   significant stenosis of the cervical right internal carotid artery based   on NASCET criteria. Patent cervical vertebral arteries.    CT angiography brain: Reconstitution of the left internal carotid artery   at the cavernous and supraclinoid segments with trickle flow. Occluded   left anterior cerebral artery. Patent left middle cerebral artery M1 and   M2 inferior division. Occlusion of the superior division of the left   middle cerebral artery. Several poorly evaluated vessels secondary to   streak artifact from the aneurysm clip. MRA may be considered for further   evaluation.    VA Duplex Carotid Arteries, Bilateral (10/24)  Summary/Impressions:  1. Left Internal Carotid Artery: Completely occluded. No  flow noted on color and spectral Doppler analyses.  2. Right Internal Carotid Artery:  There is mild  heterogenous plaque within the proximal vessel, consistent  with a 16-49% stenosis. No hemodynamically significant  stenosis.

## 2018-10-24 NOTE — SWALLOW BEDSIDE ASSESSMENT ADULT - SWALLOW EVAL: DIAGNOSIS
Patient demonstrates oropharyngeal dysphagia characterized by reduced labial seal with subsequent anterior loss of thin liquid from right side of oral cavity, delayed bolus collection, delayed anterior-posterior transfer, and suspected premature spillage of thinner liquid viscosities (i.e. thin/nectar) over base of tongue. Pharyngeal stage was marked by a suspected delay in pharyngeal trigger with reduced hyolaryngeal excursion upon digital palpation. Multiple swallows were exhibited noted with nectar-thick and thin liquid trials suggestive of pharyngeal stasis vs laryngeal penetration/aspiration. No overt, clinical s/s noted with puree and honey-thick liquids. Patient demonstrates oropharyngeal dysphagia characterized by reduced labial seal with subsequent anterior loss of thin liquid from right side of oral cavity, delayed bolus collection, delayed anterior-posterior transfer, and suspected premature spillage of thinner liquid viscosities (i.e. thin/nectar) over base of tongue. Pharyngeal stage was marked by a suspected delay in pharyngeal trigger with reduced hyolaryngeal excursion upon digital palpation. Multiple swallows were palpated with nectar-thick and thin liquid trials suggestive of pharyngeal stasis vs laryngeal penetration/aspiration. No overt, clinical s/s noted with puree and honey-thick liquids.

## 2018-10-24 NOTE — SWALLOW BEDSIDE ASSESSMENT ADULT - SWALLOW EVAL: RECOMMENDED FEEDING/EATING TECHNIQUES
maintain upright posture during/after eating for 30 mins/allow for swallow between intakes/crush medication (when feasible)/no straws/position upright (90 degrees)/small sips/bites

## 2018-10-24 NOTE — PROGRESS NOTE ADULT - PROBLEM SELECTOR PLAN 1
subacute stroke with extensive left carotid dz.   dw daughter Yamileth today, clip was placed 12 years ao in a hospital in [place], she does not know the name or the doctor o0r hospital name. she doesn't think pt ever had an MRI. dw MRI supervisor as well, as we can not confirm compatibility of clip unable to do MR brain. dw neuro, MR will not  in terms of stroke as appears to be due to carotid disease. ordered TTE with bubble, will have vascular consult for endarterectomy eval. no need for Tele per neuro. per daughter, pt was most likely on ASA, will start 81mg daily, and start Statin with low HDL and LDL > 100. pre DM2, tight diet control, follow up swallow eval, likely for acute rehab, will have PMR eval subacute stroke with extensive left carotid dz.   dw daughter Yamileth today, clip was placed 12 years ago in a hospital in [place], she does not know the name of the doctor or hospital. she doesn't think pt ever had an MRI. dw MRI supervisor as well, as we cannot confirm compatibility of clip unable to do MR brain. dw neuro, MR will not  in terms of stroke as subacute and appears to be due to carotid disease. ordered TTE with bubble, will have vascular consult for endarterectomy eval. no need for Tele per neuro. per daughter, pt was most likely on ASA, so will start 81mg daily, and start Statin with low HDL and LDL > 100. pre DM2, tight diet control, follow up swallow eval, likely for acute rehab, will have PMR eval subacute stroke with extensive left carotid dz.   dw daughter Yamileth today, clip was placed 12 years ago in a hospital in [place], she does not know the name of the doctor or hospital. she doesn't think pt ever had an MRI. dw MRI supervisor as well, as we cannot confirm compatibility of clip unable to do MR brain. dw neuro, MR will not  in terms of stroke as subacute and appears to be due to carotid disease. in the meantime, contacted pt's out-pt neurologist's office Dr Winkler to obtain collateral, awaiting call back. ordered TTE with bubble, will have vascular consult for endarterectomy eval. no need for Tele per neuro. per daughter, pt was most likely on ASA, so will start 81mg daily, and start Statin with low HDL and LDL > 100. pre DM2, tight diet control, follow up swallow eval, likely for acute rehab, will have PMR eval

## 2018-10-24 NOTE — PROGRESS NOTE ADULT - SUBJECTIVE AND OBJECTIVE BOX
Patient is a 63y old  Female who presents with a chief complaint of unable to ambulate (23 Oct 2018 09:35)      SUBJECTIVE / OVERNIGHT EVENTS: aphasic, only able to nod to questions, nods no to pain. no events otherwise     ROS:  unable to assess due to aphasia     MEDICATIONS  (STANDING):  dextrose 5% + sodium chloride 0.45%. 1000 milliLiter(s) (100 mL/Hr) IV Continuous <Continuous>  heparin  Injectable 5000 Unit(s) SubCutaneous every 8 hours  hydrochlorothiazide 25 milliGRAM(s) Oral daily  lacosamide 100 milliGRAM(s) Oral two times a day  NIFEdipine XL 90 milliGRAM(s) Oral daily  spironolactone 25 milliGRAM(s) Oral daily    MEDICATIONS  (PRN):      T(C): 37.1 (10-24-18 @ 06:29)  HR: 62 (10-24-18 @ 06:29)  BP: 137/84 (10-24-18 @ 06:29)  RR: 18 (10-24-18 @ 06:29)  SpO2: 99% (10-24-18 @ 06:29)  CAPILLARY BLOOD GLUCOSE      POCT Blood Glucose.: 106 mg/dL (24 Oct 2018 08:58)    I&O's Summary    23 Oct 2018 07:01  -  24 Oct 2018 07:00  --------------------------------------------------------  IN: 700 mL / OUT: 0 mL / NET: 700 mL    24 Oct 2018 07:01  -  24 Oct 2018 11:20  --------------------------------------------------------  IN: 1200 mL / OUT: 0 mL / NET: 1200 mL        PHYSICAL EXAM:  GENERAL: NAD, alert, aphasic   HEAD:  Atraumatic, Normocephalic  EYES: EOMI, PERRL, conjunctiva and sclera clear  NECK: Supple, No JVD  CHEST/LUNG: Clear to auscultation bilaterally  HEART: Regular rate and rhythm; No murmurs, rubs, or gallops, No Edema  ABDOMEN: Soft, Nontender, Nondistended; Bowel sounds present  EXTREMITIES:  2+ Peripheral Pulses, No clubbing, cyanosis  PSYCH: No SI/HI  NEUROLOGY: R sided paresis, r facial asymmetry   SKIN: No rashes or lesions    LABS:                        13.2   9.12  )-----------( 208      ( 24 Oct 2018 06:19 )             39.5     10-24    140  |  104  |  10  ----------------------------<  96  3.8   |  22  |  0.73    Ca    9.3      24 Oct 2018 06:19  Phos  4.4     10-24  Mg     2.1     10-24    TPro  6.7  /  Alb  3.6  /  TBili  0.6  /  DBili  x   /  AST  13  /  ALT  13  /  AlkPhos  83  10-24    PT/INR - ( 22 Oct 2018 23:33 )   PT: 11.1 SEC;   INR: 0.97          PTT - ( 22 Oct 2018 23:33 )  PTT:27.6 SEC              RADIOLOGY & ADDITIONAL TESTS:    Imaging Personally Reviewed: CT A - occlusion L ICA     Consultant(s) Notes Reviewed:      Care Discussed with Consultants/Other Providers: neuro resident

## 2018-10-24 NOTE — CHART NOTE - NSCHARTNOTEFT_GEN_A_CORE
Patient is a 63y old  Female who presents with a chief complaint of unable to ambulate (24 Oct 2018 11:20). Hx of stroke about 3 months ago. consulted PM&R, will f/u cs.

## 2018-10-24 NOTE — SWALLOW BEDSIDE ASSESSMENT ADULT - PHARYNGEAL PHASE
Delayed pharyngeal swallow/Decreased laryngeal elevation Multiple swallows/Delayed pharyngeal swallow/Decreased laryngeal elevation

## 2018-10-25 PROCEDURE — 99233 SBSQ HOSP IP/OBS HIGH 50: CPT

## 2018-10-25 PROCEDURE — 74230 X-RAY XM SWLNG FUNCJ C+: CPT | Mod: 26

## 2018-10-25 RX ORDER — LACOSAMIDE 50 MG/1
100 TABLET ORAL
Qty: 0 | Refills: 0 | Status: DISCONTINUED | OUTPATIENT
Start: 2018-10-25 | End: 2018-11-01

## 2018-10-25 RX ADMIN — LACOSAMIDE 100 MILLIGRAM(S): 50 TABLET ORAL at 17:53

## 2018-10-25 RX ADMIN — Medication 90 MILLIGRAM(S): at 05:57

## 2018-10-25 RX ADMIN — Medication 25 MILLIGRAM(S): at 05:58

## 2018-10-25 RX ADMIN — HEPARIN SODIUM 5000 UNIT(S): 5000 INJECTION INTRAVENOUS; SUBCUTANEOUS at 12:43

## 2018-10-25 RX ADMIN — HEPARIN SODIUM 5000 UNIT(S): 5000 INJECTION INTRAVENOUS; SUBCUTANEOUS at 21:53

## 2018-10-25 RX ADMIN — ATORVASTATIN CALCIUM 80 MILLIGRAM(S): 80 TABLET, FILM COATED ORAL at 21:53

## 2018-10-25 RX ADMIN — LACOSAMIDE 100 MILLIGRAM(S): 50 TABLET ORAL at 06:03

## 2018-10-25 RX ADMIN — Medication 81 MILLIGRAM(S): at 11:16

## 2018-10-25 RX ADMIN — HEPARIN SODIUM 5000 UNIT(S): 5000 INJECTION INTRAVENOUS; SUBCUTANEOUS at 05:58

## 2018-10-25 RX ADMIN — SPIRONOLACTONE 25 MILLIGRAM(S): 25 TABLET, FILM COATED ORAL at 05:58

## 2018-10-25 NOTE — PROGRESS NOTE ADULT - PROBLEM SELECTOR PLAN 1
subacute stroke with extensive left carotid dz.   dw daughter Yamileth, clip was placed 12 years ago in a hospital in [place], she does not know the name of the doctor or hospital. she doesn't think pt ever had an MRI. dw MRI supervisor as well, as we cannot confirm compatibility of clip unable to do MR brain. dw neuro, MR will not  in terms of stroke as subacute and appears to be due to carotid disease. in the meantime, contacted pt's out-pt neurologist's office Dr Winkler to obtain collateral, awaiting call back. ordered TTE with bubble - will check w neuro if needed in setting of known carotid disease. Saeed vascular consult - as complete L carotid occlusion and presence of collateral flow, no indication for intervention, cw medical management. no need for Tele per neuro. per daughter, pt was most likely on ASA, so started 81mg daily, and high intensity Statin in setting of low HDL and LDL > 100. pre DM2, tight diet control. saeed swallow eval, on dysphagia diet, pending cineesophagogram. for likely acute rehab, PMR eval called

## 2018-10-25 NOTE — SWALLOW VFSS/MBS ASSESSMENT ADULT - ORAL PHASE
within functional limits Delayed oral transit time/Reduced anterior - posterior transport Within functional limits

## 2018-10-25 NOTE — PROGRESS NOTE ADULT - SUBJECTIVE AND OBJECTIVE BOX
Patient is a 63y old  Female who presents with a chief complaint of unable to ambulate (24 Oct 2018 14:21)      SUBJECTIVE / OVERNIGHT EVENTS: pt appears frustrated that she is unable to communicate. I asked her if she is in pain she nods no, I asked her to write down what's on her mind, she is unable to due to weakness. I told her that I am in touch with her sister and it seemed to make her feel calmer. otherwise, no events no new weakness noted     ROS:  unable to assess due to aphasia     MEDICATIONS  (STANDING):  aspirin enteric coated 81 milliGRAM(s) Oral daily  atorvastatin 80 milliGRAM(s) Oral at bedtime  heparin  Injectable 5000 Unit(s) SubCutaneous every 8 hours  hydrochlorothiazide 25 milliGRAM(s) Oral daily  lacosamide 100 milliGRAM(s) Oral two times a day  NIFEdipine XL 90 milliGRAM(s) Oral daily  spironolactone 25 milliGRAM(s) Oral daily    MEDICATIONS  (PRN):      T(C): 37.2 (10-25-18 @ 10:49)  HR: 68 (10-25-18 @ 10:49)  BP: 119/78 (10-25-18 @ 10:49)  RR: 16 (10-25-18 @ 10:49)  SpO2: 95% (10-25-18 @ 10:49)  CAPILLARY BLOOD GLUCOSE        I&O's Summary    24 Oct 2018 07:01  -  25 Oct 2018 07:00  --------------------------------------------------------  IN: 1200 mL / OUT: 0 mL / NET: 1200 mL        PHYSICAL EXAM:  GENERAL: anxious, aphasic   HEAD:  Atraumatic, Normocephalic  EYES: EOMI, PERRL, conjunctiva and sclera clear  NECK: Supple, No JVD  CHEST/LUNG: Clear to auscultation bilaterally  HEART: Regular rate and rhythm; No murmurs, rubs, or gallops, No Edema  ABDOMEN: Soft, Nontender, Nondistended; Bowel sounds present  EXTREMITIES:  2+ Peripheral Pulses, No clubbing, cyanosis  PSYCH: No SI/HI  NEUROLOGY: R paresis, R facial asymmetry, aphasic    SKIN: No rashes or lesions    LABS:                        13.2   9.12  )-----------( 208      ( 24 Oct 2018 06:19 )             39.5     10-24    140  |  104  |  10  ----------------------------<  96  3.8   |  22  |  0.73    Ca    9.3      24 Oct 2018 06:19  Phos  4.4     10-24  Mg     2.1     10-24    TPro  6.7  /  Alb  3.6  /  TBili  0.6  /  DBili  x   /  AST  13  /  ALT  13  /  AlkPhos  83  10-24                  RADIOLOGY & ADDITIONAL TESTS:    Imaging Personally Reviewed: Duplex carotid - complete L carotid occlusion     Consultant(s) Notes Reviewed:      Care Discussed with Consultants/Other Providers: PMR - DR Gilbert

## 2018-10-25 NOTE — SWALLOW VFSS/MBS ASSESSMENT ADULT - ORAL PREP COMMENTS
Slow/Prolonged Chewing for Solid Consistency reduced oral containment with intermittent anterior loss on the right side of the oral cavity

## 2018-10-25 NOTE — PROGRESS NOTE ADULT - SUBJECTIVE AND OBJECTIVE BOX
Attempted to see pt.  Pt sleeping vs. resting with eyes closed.  Would not open her eyes to verbal, tactile stim, or gentle shaking to arouse, despite multiple attempts.    Chart reviewed.  63 F with PMH of HTN and remote hemorrhagic stroke(s)? (about 12-15 yrs ago) due to aneurysm, w/ h/o crani.  Has been out of the country of late and reportedly had a stroke while abroad in the [place] about 3 months ago, causing R-sided weakness.  Her tx, care and functional status over the last 3 months since the most recent stroke is unclear from the chart.  There is also a question of h/o seizure.  On 10/22/18, she traveled by airplane back to the [place] and was met by daughter at the airport.  Daughter took pt from airport directly to [facility], presenting to the ED in the evening of 10/22/18, as dtr was apparently surprised by the level of the pt's deficits and unable to care for pt at home.  W/u here has shown imaging evidence of R crani with aneurysmal clip in the R parasellar region, small L parietal cortical infarct and encephalomalacia.  L ICA, L VINNY, sup div of L MCA were occluded.  Labs on 10/24: HbA1C 5.7, CBC, CMP, lipids unremarkable.  She is on ASA, Vimpat, statin, HCTZ, Procardia XL, aldactone and heparin SC.  Functionally, on 10/23, PT noted that she required max A for transfers and she was unable to stand.  On 10/25, she underwent a MBS, with recs for mech soft oral diet with thin lix, asp precs.    Will reattempt to see pt later, likely tomorrow.  (No services billed today.)

## 2018-10-25 NOTE — SWALLOW VFSS/MBS ASSESSMENT ADULT - PHARYNGEAL PHASE COMMENTS
adequate initiation of the pharyngeal swallow, adequate laryngeal elevation, adequate tongue base retraction, adequate pharyngeal constriction delayed initiation of the pharyngeal swallow, adequate laryngeal elevation, adequate tongue base retraction, adequate pharyngeal constriction delayed initiation of the pharyngeal swallow, adequate laryngeal elevation, adequate tongue base retraction, adequate  pharyngeal constriction

## 2018-10-25 NOTE — SWALLOW VFSS/MBS ASSESSMENT ADULT - DIAGNOSTIC IMPRESSIONS
Patient presents Mild Oral Stage and Mild Pharyngeal Stage Dysphagia. The Oral Stage is characterized by reduced oral containment with intermittent anterior loss/spillage on the right side of the oral cavity due to reduced lip seal/closure for cup sip drinking, slow/prolonged chewing for solid consistency, slow bolus manipulation, slow tongue motion with slow anterior to posterior transfer of the bolus for solid consistency; there is adequate bolus manipulation and adequate tongue motion for anterior to posterior transfer for puree. There is adequate oral clearance post swallow.   The Pharyngeal Stage is characterized by delayed initiation of the pharyngeal swallow (Bolus head is at the pyriforms for Thin Liquids), adequate laryngeal elevation, adequate tongue base and adequate pharyngeal constriction. There is adequate pharyngeal clearance post swallow for puree/solids.  There was Laryngeal Penetration during the swallow for Thin Liquids leaving Trace residue in the laryngeal vestibule above the level of the vocal folds via large cup sip self administered. There was Trace Laryngeal Penetration for Thin Liquids with retrieval leaving no residue in the laryngeal vestibule and airway protection maintained via small single cup sip and consecutive small single cup sips self administered.   There was No Aspiration observed across all PO Trials.

## 2018-10-26 PROCEDURE — 99233 SBSQ HOSP IP/OBS HIGH 50: CPT

## 2018-10-26 PROCEDURE — 99222 1ST HOSP IP/OBS MODERATE 55: CPT

## 2018-10-26 RX ORDER — ACETAMINOPHEN 500 MG
650 TABLET ORAL ONCE
Qty: 0 | Refills: 0 | Status: COMPLETED | OUTPATIENT
Start: 2018-10-26 | End: 2018-10-26

## 2018-10-26 RX ADMIN — SPIRONOLACTONE 25 MILLIGRAM(S): 25 TABLET, FILM COATED ORAL at 06:28

## 2018-10-26 RX ADMIN — Medication 650 MILLIGRAM(S): at 15:21

## 2018-10-26 RX ADMIN — Medication 25 MILLIGRAM(S): at 06:28

## 2018-10-26 RX ADMIN — LACOSAMIDE 100 MILLIGRAM(S): 50 TABLET ORAL at 17:56

## 2018-10-26 RX ADMIN — HEPARIN SODIUM 5000 UNIT(S): 5000 INJECTION INTRAVENOUS; SUBCUTANEOUS at 14:23

## 2018-10-26 RX ADMIN — HEPARIN SODIUM 5000 UNIT(S): 5000 INJECTION INTRAVENOUS; SUBCUTANEOUS at 06:29

## 2018-10-26 RX ADMIN — ATORVASTATIN CALCIUM 80 MILLIGRAM(S): 80 TABLET, FILM COATED ORAL at 21:27

## 2018-10-26 RX ADMIN — LACOSAMIDE 100 MILLIGRAM(S): 50 TABLET ORAL at 06:28

## 2018-10-26 RX ADMIN — HEPARIN SODIUM 5000 UNIT(S): 5000 INJECTION INTRAVENOUS; SUBCUTANEOUS at 21:27

## 2018-10-26 RX ADMIN — Medication 81 MILLIGRAM(S): at 14:21

## 2018-10-26 RX ADMIN — Medication 650 MILLIGRAM(S): at 14:21

## 2018-10-26 RX ADMIN — Medication 90 MILLIGRAM(S): at 06:28

## 2018-10-26 NOTE — PROGRESS NOTE ADULT - SUBJECTIVE AND OBJECTIVE BOX
Patient is a 63y old  Female who presents with a chief complaint of unable to ambulate (26 Oct 2018 10:16)      SUBJECTIVE / OVERNIGHT EVENTS: no events appears more calm today, nods no to pain.     ROS:  unable to assess due to aphasia     MEDICATIONS  (STANDING):  aspirin enteric coated 81 milliGRAM(s) Oral daily  atorvastatin 80 milliGRAM(s) Oral at bedtime  heparin  Injectable 5000 Unit(s) SubCutaneous every 8 hours  hydrochlorothiazide 25 milliGRAM(s) Oral daily  lacosamide 100 milliGRAM(s) Oral two times a day  NIFEdipine XL 90 milliGRAM(s) Oral daily  spironolactone 25 milliGRAM(s) Oral daily    MEDICATIONS  (PRN):      T(C): 36.9 (10-26-18 @ 05:58)  HR: 65 (10-26-18 @ 05:58)  BP: 120/79 (10-26-18 @ 05:58)  RR: 18 (10-26-18 @ 05:58)  SpO2: 98% (10-26-18 @ 05:58)  CAPILLARY BLOOD GLUCOSE        I&O's Summary      PHYSICAL EXAM:  GENERAL: NAD, Aphasic   HEAD:  Atraumatic, Normocephalic  EYES: EOMI, PERRL, conjunctiva and sclera clear  NECK: Supple, No JVD  CHEST/LUNG: Clear to auscultation bilaterally  HEART: Regular rate and rhythm; No murmurs, rubs, or gallops, No Edema  ABDOMEN: Soft, Nontender, Nondistended; Bowel sounds present  EXTREMITIES:  2+ Peripheral Pulses, No clubbing, cyanosis  PSYCH: No SI/HI  NEUROLOGY: R paresis   SKIN: No rashes or lesions    LABS:        TSH 2.2                 RADIOLOGY & ADDITIONAL TESTS:    Imaging Personally Reviewed: cineesophagogram - no aspiration     Consultant(s) Notes Reviewed:      Care Discussed with Consultants/Other Providers: PMR - Dr Han

## 2018-10-26 NOTE — CONSULT NOTE ADULT - SUBJECTIVE AND OBJECTIVE BOX
CC: s/p stroke ~7/28/2018 with residual aphasia and R HP.      HPI: Hx from chart and telephone d/w Dtr Yamileth Matson this AM.        In ED, patient was afebrile and VSS. Labs were unremarkable. CT head and CTA head/neck showed old craniotomy, left sided infarct and bilateral encephalomalacias. Completely occluded left internal carotid artery.  Occluded left anterior cerebral artery. Occlusion of the superior division of the left middle cerebral artery. Neurology was consulted (23 Oct 2018 09:35)      REVIEW OF SYSTEMS: No chest pain, shortness of breath, nausea, vomiting or diarhea.      PAST MEDICAL & SURGICAL HISTORY  Cerebral aneurysm rupture  Aneurysm  Hemorrhagic stroke  Seizure  Hypertension, unspecified type  H/O craniotomy  S/P cholecystectomy      SOCIAL HISTORY  Smoking - Denied, EtOH - Denied, Drugs - Denied    FUNCTIONAL HISTORY:   Lives   Independent    CURRENT FUNCTIONAL STATUS:      FAMILY HISTORY   No pertinent family history in first degree relatives      RECENT LABS/IMAGING              VITALS  T(C): 36.9 (10-26-18 @ 05:58), Max: 37.2 (10-25-18 @ 10:49)  HR: 65 (10-26-18 @ 05:58) (63 - 71)  BP: 120/79 (10-26-18 @ 05:58) (119/78 - 136/82)  RR: 18 (10-26-18 @ 05:58) (16 - 18)  SpO2: 98% (10-26-18 @ 05:58) (95% - 100%)  Wt(kg): --    ALLERGIES  No Known Allergies      MEDICATIONS   aspirin enteric coated 81 milliGRAM(s) Oral daily  atorvastatin 80 milliGRAM(s) Oral at bedtime  heparin  Injectable 5000 Unit(s) SubCutaneous every 8 hours  hydrochlorothiazide 25 milliGRAM(s) Oral daily  lacosamide 100 milliGRAM(s) Oral two times a day  NIFEdipine XL 90 milliGRAM(s) Oral daily  spironolactone 25 milliGRAM(s) Oral daily      ----------------------------------------------------------------------------------------  PHYSICAL EXAM  Constitutional - NAD, Comfortable  HEENT - NCAT, EOMI  Neck - Supple, No limited ROM  Chest - CTA bilaterally, No wheeze, No rhonchi, No crackles  Cardiovascular - RRR, S1S2, No murmurs  Abdomen - BS+, Soft, NTND  Extremities - No C/C/E, No calf tenderness   Neurologic Exam -                    Cognitive - Awake, Alert, AAO to self, place, date, year, situation     Communication - Fluent, No dysarthria, no aphasia     Cranial Nerves - CN 2-12 intact     Motor - No focal deficits                       Sensory - Intact to LT     Reflexes - DTR Intact, No primitive reflexive     Balance - WNL Static  Psychiatric - Mood stable, Affect WNL CC: s/p stroke ~7/28/18 with residual aphasia and R HP.      HPI: Hx from chart and telephone d/w Dtr Yamileth Matson this AM.    63 F with PMH of HTN and remote hemorrhagic stroke about 12 yrs ago due to aneurysm, w/ h/o craniotomy, clipping done in [place] at the time.  Post stroke she made an essentially full recovery, ambulating and speaking normally.  Occasionally, she had a minor issue with memory, but was nevertheless independent and fully functional.  Per daughter, on about 7/28/18, pt had a stroke while in the [place] causing severe aphasia and right-sided weakness, with total inability to walk.  She was hospitalized then discharged to the community where she stayed with family.  She was essentially dependent for all care.  She did outpatient rehab 2x/week, paid out of pocket.  She was dependently transferred by 2 people into a car to go to outpatient rehab.  She did not have a wheelchair because the family could not afford it.  Pt only [place] effective in the [place].  The pt's family was trying to get [place] back to the [place] where she would receive "better care" and where she has insurance.  There were logistical delays in getting clearance and a ticket to fly back to the [place].  From the airport, she came to [facility], helped by a friend of a the family.  There have been no significant medical events since the stroke on ~ 7/28/18.  Per dtr, pt understands some English at baseline, but does not speak English.  Since the July 2018 stroke, she can say a few phrases in [place] that are intelligible, e.g., "Oh God!" and daughter's name, but she otherwise is severely incapacitated with speech in [place].  She does appear to fully understand everything per dtr.  Pt gets frustrated b/c she cannot speak.  Per dtr, pt does not have a residence to stay in at present.  Pt's dtr Ms. Matson is renting a room in a home in [place], [place] and does not have additional space for pt.  Dtr is disabled herself, not working, due to back issues.  Per dtr, there are no other available family members around.  Dtr's plan is for the pt to go to a rehab then long-term care facility until she is better enough to live in the community.  Dtr has visited [facility], a facility within walking distance of the dtr's residence.  She toured the place and found it satisfactory.  Dtr wishes strongly that pt be transferred there for the next stage of care if possible.    Pt's hospital care at [facility] is notable for presentation to the ED in the evening of 10/22/18.  W/u here has shown imaging evidence of R crani with aneurysmal clip in the R parasellar region, small L parietal cortical infarct and encephalomalacia.  L ICA, L VINNY, sup div of L MCA were occluded.  MRI has not been pursued b/c it is not clear whether the aneurysm clip is MRI compatible and it would not  at this time.  Labs on 10/24: HbA1C 5.7, CBC, CMP, lipids unremarkable.  Echocardiogram is pending.  Treatment wise, she is on ASA, Vimpat, statin, HCTZ, Procardia XL, aldactone and heparin SC.  Functionally, on 10/23, PT noted that she required max A for transfers and she was unable to stand.  On 10/25, she underwent a MBS, with recs for mech soft oral diet with thin lix, asp precs.  Today, she required quite a lot of (essentially total) assistance for getting washed up, which was done by PCAs x 2 with pt in bed.    REVIEW OF SYSTEMS: No pain.    PAST MEDICAL & SURGICAL HISTORY  Cerebral aneurysm rupture  Aneurysm  Hemorrhagic stroke  Seizure  Hypertension, unspecified type  H/O craniotomy  S/P cholecystectomy    SOCIAL / FUNCTIONAL HISTORY:   See above.  Negative smoking, EtOH per chart.    FAMILY HISTORY   No pertinent family history in first degree relatives    ALLERGIES  No Known Allergies    MEDICATIONS   aspirin enteric coated 81 milliGRAM(s) Oral daily  atorvastatin 80 milliGRAM(s) Oral at bedtime  heparin  Injectable 5000 Unit(s) SubCutaneous every 8 hours  hydrochlorothiazide 25 milliGRAM(s) Oral daily  lacosamide 100 milliGRAM(s) Oral two times a day  NIFEdipine XL 90 milliGRAM(s) Oral daily  spironolactone 25 milliGRAM(s) Oral daily    ----------------------------------------------------------------------------------------  PHYSICAL EXAM    T(C): 36.9 (10-26-18 @ 05:58), Max: 37.2 (10-25-18 @ 10:49)  HR: 65 (10-26-18 @ 05:58) (63 - 71)  BP: 120/79 (10-26-18 @ 05:58) (119/78 - 136/82)  RR: 18 (10-26-18 @ 05:58) (16 - 18)  SpO2: 98% (10-26-18 @ 05:58) (95% - 100%)  Wt(kg): --    Constitutional - NAD, Comfortable  HEENT - NCAT  Extremities - No C/C/E, No calf tenderness   Neurologic Exam -                    Cognitive/speech - Awake, Alert, severe expressive aphasia, nods head yes/no accurately, follows gestured commands.     Motor - No volitional R upper or R lower limb movement on command.  Grossly normal on L upper and L lower limbs.     There is increased tone in the R UEx, with pain on attempts at even minimal PROM of the elbow and wrist.  The elbow is held closely against the body.

## 2018-10-26 NOTE — PROGRESS NOTE ADULT - PROBLEM SELECTOR PLAN 1
subacute stroke with extensive left carotid dz.   dw daughter Yamileth, clip was placed 12 years ago in a hospital in [place], she does not know the name of the doctor or hospital. she doesn't think pt ever had an MRI. dw MRI supervisor as well, as we cannot confirm compatibility of clip unable to do MR brain. dw neuro, MR will not  in terms of stroke as subacute and appears to be due to carotid disease. in the meantime, contacted pt's out-pt neurologist's office Dr Winkler to obtain collateral, awaiting call back. per neuro TTE can be as out-pt and no objection to DC. Saeed vascular consult - as complete L carotid occlusion and presence of collateral flow, no indication for intervention, cw medical management. no need for Tele per neuro. per daughter, pt was most likely on ASA, so started 81mg daily, and high intensity Statin in setting of low HDL and LDL > 100. pre DM2, tight diet control. saeed swallow eval, on dysphagia diet, s/p cineesophagogram. for rehab, PMR eval saeed

## 2018-10-26 NOTE — CONSULT NOTE ADULT - ASSESSMENT
63F remote hemorrhagic stroke due to ruptured aneursym, h/o clip ~12 yrs ago in [place], [place].  Minimal to no residual deficits after that remote stroke.  Now s/p stroke on ~7/28/18 while in [facility] () with severe expressive aphasia, mild dysphagia (cleared for [facility] soft with thin liquids), and dense R hemiplegia.  After her acute hospitalization, she received some outpt rehab in , but no inpt rehab.  There were logisitical delays in repatriating to the , which was the family's goal, in order to optimize care and be covered by insurance.  The pt has not had significant functional recovery over the last 3 months.  She has been requiring required max to total assist for basic self care and mobility, which is consistent with the findings here at [facility].  She has been unable to ambulate since the July 2018 stroke.    Once medically cleared, pt is an appropriate candidate at this time for subacute inpatient rehabilitation (PEDRO) to address her persistent/residual, but now subacute bordering on chronic, deficits from her stroke in July 2018, including management of her R-sided tone, and maximizing overall function despite her R sided impairments, primarily using compensatory techniques, durable medical equipment and other standard poststroke rehabilitation strategies.  She is not anticipated to tolerate or marginally benefit from more intensive (acute inpt) rehabilitation.  After her PEDRO stay, she will likely require LTC until she is able to reintegrate back to the community with family or other assistance.  She will need ongoing medical and neurological f/u, which can provided from her PEDRO/LTC, in the inpatient and outpatient settings.

## 2018-10-27 PROCEDURE — 93306 TTE W/DOPPLER COMPLETE: CPT | Mod: 26

## 2018-10-27 PROCEDURE — 99232 SBSQ HOSP IP/OBS MODERATE 35: CPT

## 2018-10-27 RX ORDER — INFLUENZA VIRUS VACCINE 15; 15; 15; 15 UG/.5ML; UG/.5ML; UG/.5ML; UG/.5ML
0.5 SUSPENSION INTRAMUSCULAR ONCE
Qty: 0 | Refills: 0 | Status: DISCONTINUED | OUTPATIENT
Start: 2018-10-27 | End: 2018-11-08

## 2018-10-27 RX ADMIN — HEPARIN SODIUM 5000 UNIT(S): 5000 INJECTION INTRAVENOUS; SUBCUTANEOUS at 21:26

## 2018-10-27 RX ADMIN — Medication 25 MILLIGRAM(S): at 06:36

## 2018-10-27 RX ADMIN — ATORVASTATIN CALCIUM 80 MILLIGRAM(S): 80 TABLET, FILM COATED ORAL at 21:26

## 2018-10-27 RX ADMIN — LACOSAMIDE 100 MILLIGRAM(S): 50 TABLET ORAL at 06:36

## 2018-10-27 RX ADMIN — HEPARIN SODIUM 5000 UNIT(S): 5000 INJECTION INTRAVENOUS; SUBCUTANEOUS at 06:36

## 2018-10-27 RX ADMIN — HEPARIN SODIUM 5000 UNIT(S): 5000 INJECTION INTRAVENOUS; SUBCUTANEOUS at 14:00

## 2018-10-27 RX ADMIN — SPIRONOLACTONE 25 MILLIGRAM(S): 25 TABLET, FILM COATED ORAL at 06:36

## 2018-10-27 RX ADMIN — LACOSAMIDE 100 MILLIGRAM(S): 50 TABLET ORAL at 17:57

## 2018-10-27 RX ADMIN — Medication 81 MILLIGRAM(S): at 14:00

## 2018-10-27 RX ADMIN — Medication 90 MILLIGRAM(S): at 06:36

## 2018-10-27 NOTE — PROGRESS NOTE ADULT - PROBLEM SELECTOR PLAN 1
subacute stroke with extensive left carotid dz.   clip was placed 12 years ago in a hospital in [place], unclear if compatible with MRI but will not  at this time. No vascular intervention  c/w ASA 81mg daily, statin, dysphagia diet  Awaiting rehab

## 2018-10-27 NOTE — PROGRESS NOTE ADULT - SUBJECTIVE AND OBJECTIVE BOX
· Subjective and Objective: 	  patient is aphasic - unable to use      HPI:    63y f w PMHx strokes in the past p/w R sided paralysis s/p stroke. Pt had several stroke 15yrs ago and was apparently diagnosed w/ a brain aneurysm, which was stable for many years. She has been out of the country for the last year and 3 months ago had another stroke, which left her paralysed on the R upper and lower extremities and with R sided facial paralysis. No left sided deficits. [place] and her daughter picked her up from airport, not expecting the extent of neurological deficits. The pt was immediately brought to the ED by her daughter  for evaluation, as she did not know if additional medical care would be required and was not equipped at home to deal with a paralyzed guest.       MEDICATIONS  (STANDING):    MEDICATIONS  (PRN):      PAST MEDICAL & SURGICAL HISTORY:  S/P cholecystectomy      FAMILY HISTORY:      Allergies    No Known Allergies    Intolerances          SHx - No smoking, No ETOH, No drug abuse      Review of Systems:  CONSTITUTIONAL:  No weight loss, fever, chills, weakness or fatigue.  HEENT:  Eyes:  No visual loss, blurred vision, double vision or yellow sclerae. Ears, Nose, Throat:  No hearing loss, sneezing, congestion, runny nose or sore throat.  SKIN:  No rash or itching.  CARDIOVASCULAR:  No chest pain, chest pressure or chest discomfort. No palpitations or edema.  RESPIRATORY:  No shortness of breath, cough or sputum.  GASTROINTESTINAL:  No anorexia, nausea, vomiting or diarrhea. No abdominal pain or blood.  GENITOURINARY:  NO Burning on urination.   NEUROLOGICAL: See HPI  MUSCULOSKELETAL:  No muscle, back pain, joint pain or stiffness.  HEMATOLOGIC:  No anemia, bleeding or bruising.  LYMPHATICS:  No enlarged nodes. No history of splenectomy.  PSYCHIATRIC:  No history of depression or anxiety.  ENDOCRINOLOGIC:  No reports of sweating, cold or heat intolerance. No polyuria or polydipsia.  ALLERGIES:  No history of asthma, hives, eczema or rhinitis.        Vital Signs Last 24 Hrs  T(C): 36.6  HR: 68  BP: 138/60  RR 16    General Exam:   General appearance: No acute distress                   Neurological Exam: limited     Mental Status: alert  awake, minimal verbal output - not making sense, following commands   Cranial Nerves: PERRL, EOMI, CN V1-3 intact to light touch and pinprick.  mild facial asymmetry- right , Tongue midline.    Motor:             Strength: 0/5 on right UE and RLE, 5/5 in LUE and LLE   Tremor: No resting, postural or action tremor.  No myoclonus.  Sensation: intact to light touch on left, intact on right but not reliable   Gait: deferred     Other:    10-22    141  |  103  |  13  ----------------------------<  107<H>  4.1   |  24  |  0.68    Ca    9.6      22 Oct 2018 23:30    TPro  7.6  /  Alb  4.2  /  TBili  0.2  /  DBili  x   /  AST  15  /  ALT  15  /  AlkPhos  86  10-22    10-22    141  |  103  |  13  ----------------------------<  107<H>  4.1   |  24  |  0.68    Ca    9.6      22 Oct 2018 23:30    TPro  7.6  /  Alb  4.2  /  TBili  0.2  /  DBili  x   /  AST  15  /  ALT  15  /  AlkPhos  86  10-22                          13.9   11.01 )-----------( 244      ( 22 Oct 2018 23:30 )             42.1       Radiology    CT head     < from: CT Angio Head w/ IV Cont (10.23.18 @ 02:35) >  CT brain: Post right pterional craniotomy with aneurysm clip in the right   parasellar region. Encephalomalacia in the right inferior frontal and   temporal lobes. Wide region of encephalomalacia in the left frontal lobe   and left basal ganglia likely reflective of an old infarct.  Small chronic appearing left parietal cortical infarct. No gross acute   intracranial hemorrhage or significant mass effect.    CT angiography neck: Completely occluded left internal carotid artery. No   significant stenosis of the cervical right internal carotid artery based   on NASCET criteria. Patent cervical vertebral arteries.    CT angiography brain: Reconstitution of the left internal carotid artery   at the cavernous and supraclinoid segments with trickle flow. Occluded   left anterior cerebral artery. Patent left middle cerebral artery M1 and   M2 inferior division. Occlusion of the superior division of the left   middle cerebral artery. Several poorly evaluated vessels secondary to   streak artifact from the aneurysm clip. MRA may be considered for further   evaluation.    < end of copied text >                   Assessment and Recommendation:   · Assessment		  - unable to use  as patient is aphasic.     63y f w PMHx strokes in the past p/w R sided paralysis s/p stroke. Pt had several stroke 15yrs ago and was apparently diagnosed w/ a brain aneurysm, which was stable for many years. She has been out of the country for the last year and 3 months ago had another stroke, which left her paralysed on the R upper and lower extremities and with R sided facial paralysis. No left sided deficits. [place] and her daughter picked her up from airport, not expecting the extent of neurological deficits. The pt was immediately brought to the ED by her daughter  for evaluation, as she did not know if additional medical care would be required and was not equipped at home to deal with a paralyzed guest. Neurological examination showed right facial asymmetry, right hemiplegia, aphasia. CT head showed chronic old infarcts in bilateral hemisphere.     Impression     b/l chronic infarct with encephalomalacia     Plan   Aneurysm clip may be MRI incompatible; need more information  -Aspirin 81 mg daily  -DVT prophylaxis  -CTA head/enck  -TTE with bubble study  -atorvastatin 80 mg  -telemetry

## 2018-10-27 NOTE — PROGRESS NOTE ADULT - SUBJECTIVE AND OBJECTIVE BOX
Patient is a 63y old  Female who presents with a chief complaint of unable to ambulate (26 Oct 2018 11:11)      SUBJECTIVE / OVERNIGHT EVENTS: No acute events overnight. Shakes head yes/no to Qs. Denies pain or difficulty swallowing. Per RN, no cough noted with eating.    MEDICATIONS  (STANDING):  aspirin enteric coated 81 milliGRAM(s) Oral daily  atorvastatin 80 milliGRAM(s) Oral at bedtime  heparin  Injectable 5000 Unit(s) SubCutaneous every 8 hours  hydrochlorothiazide 25 milliGRAM(s) Oral daily  lacosamide 100 milliGRAM(s) Oral two times a day  NIFEdipine XL 90 milliGRAM(s) Oral daily  spironolactone 25 milliGRAM(s) Oral daily    MEDICATIONS  (PRN):      T(C): 36.7 (10-27-18 @ 06:21), Max: 36.7 (10-26-18 @ 14:43)  HR: 88 (10-27-18 @ 06:21) (83 - 88)  BP: 150/90 (10-27-18 @ 06:21) (109/72 - 150/90)  RR: 18 (10-27-18 @ 06:21) (18 - 18)  SpO2: 98% (10-27-18 @ 06:21) (97% - 98%)  CAPILLARY BLOOD GLUCOSE        I&O's Summary    PHYSICAL EXAM:  GENERAL: NAD, Aphasic  EYES: EOMI, PERRL, conjunctiva and sclera clear  CHEST/LUNG: Clear to auscultation bilaterally  HEART: Regular rate and rhythm; No murmurs, rubs, or gallops, No Edema  ABDOMEN: Soft, Nontender, Nondistended; Bowel sounds present  EXTREMITIES:  2+ Peripheral Pulses, No clubbing, cyanosis  NEUROLOGY: Right upper and lower extremity paresis, able to move left upper and lower extremity, aphasic  SKIN: No rashes or lesions    LABS:                    RADIOLOGY & ADDITIONAL TESTS:

## 2018-10-28 ENCOUNTER — TRANSCRIPTION ENCOUNTER (OUTPATIENT)
Age: 63
End: 2018-10-28

## 2018-10-28 PROCEDURE — 99232 SBSQ HOSP IP/OBS MODERATE 35: CPT

## 2018-10-28 RX ORDER — ATORVASTATIN CALCIUM 80 MG/1
1 TABLET, FILM COATED ORAL
Qty: 0 | Refills: 0 | COMMUNITY
Start: 2018-10-28

## 2018-10-28 RX ORDER — IBUPROFEN 200 MG
1 TABLET ORAL
Qty: 0 | Refills: 0 | COMMUNITY

## 2018-10-28 RX ORDER — ACETAMINOPHEN 500 MG
650 TABLET ORAL EVERY 6 HOURS
Qty: 0 | Refills: 0 | Status: DISCONTINUED | OUTPATIENT
Start: 2018-10-28 | End: 2018-11-08

## 2018-10-28 RX ORDER — ASPIRIN/CALCIUM CARB/MAGNESIUM 324 MG
1 TABLET ORAL
Qty: 0 | Refills: 0 | COMMUNITY
Start: 2018-10-28

## 2018-10-28 RX ADMIN — ATORVASTATIN CALCIUM 80 MILLIGRAM(S): 80 TABLET, FILM COATED ORAL at 21:21

## 2018-10-28 RX ADMIN — SPIRONOLACTONE 25 MILLIGRAM(S): 25 TABLET, FILM COATED ORAL at 06:28

## 2018-10-28 RX ADMIN — HEPARIN SODIUM 5000 UNIT(S): 5000 INJECTION INTRAVENOUS; SUBCUTANEOUS at 06:28

## 2018-10-28 RX ADMIN — Medication 650 MILLIGRAM(S): at 18:30

## 2018-10-28 RX ADMIN — Medication 81 MILLIGRAM(S): at 13:11

## 2018-10-28 RX ADMIN — LACOSAMIDE 100 MILLIGRAM(S): 50 TABLET ORAL at 17:36

## 2018-10-28 RX ADMIN — HEPARIN SODIUM 5000 UNIT(S): 5000 INJECTION INTRAVENOUS; SUBCUTANEOUS at 21:21

## 2018-10-28 RX ADMIN — Medication 90 MILLIGRAM(S): at 06:28

## 2018-10-28 RX ADMIN — HEPARIN SODIUM 5000 UNIT(S): 5000 INJECTION INTRAVENOUS; SUBCUTANEOUS at 13:11

## 2018-10-28 RX ADMIN — Medication 650 MILLIGRAM(S): at 17:38

## 2018-10-28 RX ADMIN — LACOSAMIDE 100 MILLIGRAM(S): 50 TABLET ORAL at 06:31

## 2018-10-28 RX ADMIN — Medication 25 MILLIGRAM(S): at 06:28

## 2018-10-28 NOTE — PROGRESS NOTE ADULT - PROBLEM SELECTOR PLAN 1
subacute stroke with extensive left carotid dz.   clip was placed 12 years ago in a hospital in [place], unclear if compatible with MRI but will not  at this time. CTA H/N done instead. No vascular intervention  c/w ASA 81mg daily, statin, dysphagia diet  TTE unremarkable, EF: 63%  Awaiting rehab

## 2018-10-28 NOTE — DISCHARGE NOTE ADULT - PROVIDER TOKENS
FREE:[LAST:[Dr. Sullivan ([facility])],PHONE:[(   )    -],FAX:[(   )    -],ADDRESS:[Neurologist]] FREE:[LAST:[Dr. Sullivan ([facility])],PHONE:[(   )    -],FAX:[(   )    -],ADDRESS:[Neurologist]],FREE:[LAST:[[facility]],PHONE:[(   )    -],FAX:[(   )    -],ADDRESS:[[phone].]],FREE:[LAST:[Medical Provider],PHONE:[(   )    -],FAX:[(   )    -],ADDRESS:[[facility]]]

## 2018-10-28 NOTE — DISCHARGE NOTE ADULT - CARE PLAN
Principal Discharge DX:	Cerebrovascular accident (CVA), unspecified mechanism  Goal:	Stable  Assessment and plan of treatment:	You were seen by Vascular surgery and no acute intervention was recommended. Continue with supportive care, assistance with activities of daily living. Continue with ASA, statin, good blood pressure and glucose control. Follow up providers at facility within 1 week for further management.  Secondary Diagnosis:	Hypertension, unspecified type  Assessment and plan of treatment:	Continue blood pressure medication regimen as directed. Monitor for any visual changes, headaches or dizziness.  Monitor blood pressure regularly.  Follow up with your PCP for further management for high blood pressure.  Secondary Diagnosis:	Seizure  Assessment and plan of treatment:	Continue anti-seizure medications as recommended. Stable. Follow up providers at facility within 1 week for further management. Principal Discharge DX:	Cerebrovascular accident (CVA), unspecified mechanism  Goal:	Stable- history of CVA  Assessment and plan of treatment:	You were seen by Vascular surgery and no acute intervention was recommended. Continue with supportive care, assistance with activities of daily living. Continue with ASA, statin, good blood pressure and glucose control. Follow up providers at facility within 1 week for further management.  Secondary Diagnosis:	Hypertension, unspecified type  Assessment and plan of treatment:	Continue blood pressure medication regimen as directed. Monitor for any visual changes, headaches or dizziness.  Monitor blood pressure regularly.  Follow up with your PCP for further management for high blood pressure.  Secondary Diagnosis:	Seizure  Assessment and plan of treatment:	Continue anti-seizure medications as recommended. Stable. Follow up providers at facility within 1 week for further management. You may also follow up with your neurologist Dr. Sullivan at [facility].  Secondary Diagnosis:	Nausea  Assessment and plan of treatment:	Continue zofran sublingual prior to meals. You were seen by speech and swallow for difficulty tolerating foods- recommended pureed diet with thin liquids.

## 2018-10-28 NOTE — DISCHARGE NOTE ADULT - REASON FOR ADMISSION
Unable to ambulate Unable to ambulate- secondary to CVA- 3 months prior to admission  Nausea and vomiting

## 2018-10-28 NOTE — DISCHARGE NOTE ADULT - CARE PROVIDER_API CALL
Dr. Sullivan ([facility]),   Neurologist  Phone: (   )    -  Fax: (   )    - Dr. Sullivan ([facility]),   Neurologist  Phone: (   )    -  Fax: (   )    -    [facility],   [phone].  Phone: (   )    -  Fax: (   )    -    Medical Provider,   At [facility]  Phone: (   )    -  Fax: (   )    -

## 2018-10-28 NOTE — DISCHARGE NOTE ADULT - MEDICATION SUMMARY - MEDICATIONS TO TAKE
I will START or STAY ON the medications listed below when I get home from the hospital:    spironolactone 25 mg oral tablet  -- 1 tab(s) by mouth once a day  -- Indication: For Hypertension, unspecified type    aspirin 81 mg oral delayed release tablet  -- 1 tab(s) by mouth once a day  -- Indication: For Cerebral aneurysm rupture    Vimpat 100 mg oral tablet  -- 1 tab(s) by mouth 2 times a day  -- Indication: For Seizure    atorvastatin 80 mg oral tablet  -- 1 tab(s) by mouth once a day (at bedtime)  -- Indication: For Cerebral aneurysm rupture    NIFEdipine 90 mg oral tablet, extended release  -- 1 tab(s) by mouth once a day  -- Indication: For Hypertension, unspecified type    hydroCHLOROthiazide 25 mg oral tablet  -- 1 tab(s) by mouth once a day  -- Indication: For Hypertension, unspecified type I will START or STAY ON the medications listed below when I get home from the hospital:    spironolactone 25 mg oral tablet  -- 1 tab(s) by mouth once a day. Hold for SBP <110   -- Indication: For Hypertension, unspecified type    aspirin 81 mg oral delayed release tablet  -- 1 tab(s) by mouth once a day  -- Indication: For Cerebrovascular accident (CVA), unspecified mechanism    acetaminophen 650 mg rectal suppository  -- 1 suppository(ies) rectally every 6 hours, As needed, for pain   -- Indication: For Pain    cloNIDine 0.1 mg/24 hr transdermal film, extended release  -- 1 patch by transdermal patch every 7 days.   Hold if SBP <110 , patch placed on 11/7/18  -- Indication: For Hypertension, unspecified type    gabapentin 250 mg/5 mL oral solution  -- 6 milliliter(s) (300 mg) by mouth every 12 hours  -- Indication: For Seizure    lacosamide 10 mg/mL oral solution  -- 10 mililiters (100 mg) by mouth 2 times a day  -- Indication: For Seizure    ondansetron 4 mg oral tablet, disintegrating  -- 1 tab(s) by mouth 3 times a day prior to meals.   -- Indication: For Nausea    atorvastatin 80 mg oral tablet  -- 1 tab(s) by mouth once a day (at bedtime)  -- Indication: For Cerebrovascular accident (CVA), unspecified mechanism    senna oral tablet  -- 2 tab(s) by mouth once a day (at bedtime)  -- Indication: For Constipation    docusate sodium 100 mg oral capsule  -- 1 cap(s) by mouth 3 times a day  -- Indication: For Constipation     polyethylene glycol 3350 oral powder for reconstitution  -- 17 gram(s) by mouth once a day  -- Indication: For Constipation

## 2018-10-28 NOTE — PROGRESS NOTE ADULT - SUBJECTIVE AND OBJECTIVE BOX
Patient is a 63y old  Female who presents with a chief complaint of unable to ambulate (27 Oct 2018 10:27)      SUBJECTIVE / OVERNIGHT EVENTS: No acute events overnight. Patient aphasic but using the picture chart at bedside, Reports some mild 3/10 pain of back but doesn't want and pain meds at this time. Sometimes when asked a question patient nods head and then shakes head no so ROS often difficult to interpret.    MEDICATIONS  (STANDING):  aspirin enteric coated 81 milliGRAM(s) Oral daily  atorvastatin 80 milliGRAM(s) Oral at bedtime  heparin  Injectable 5000 Unit(s) SubCutaneous every 8 hours  hydrochlorothiazide 25 milliGRAM(s) Oral daily  influenza   Vaccine 0.5 milliLiter(s) IntraMuscular once  lacosamide 100 milliGRAM(s) Oral two times a day  NIFEdipine XL 90 milliGRAM(s) Oral daily  spironolactone 25 milliGRAM(s) Oral daily    MEDICATIONS  (PRN):      T(C): 36.8 (10-28-18 @ 06:11), Max: 37 (10-27-18 @ 20:18)  HR: 68 (10-28-18 @ 06:11) (68 - 78)  BP: 129/84 (10-28-18 @ 06:11) (126/82 - 131/82)  RR: 18 (10-28-18 @ 06:11) (18 - 18)  SpO2: 98% (10-28-18 @ 06:11) (97% - 100%)  CAPILLARY BLOOD GLUCOSE        I&O's Summary      PHYSICAL EXAM:  GENERAL: NAD, Aphasic  EYES: sclera clear  CHEST/LUNG: Clear to auscultation bilaterally  HEART: s1/s2, no murmurs, No Edema  ABDOMEN: Soft, Nontender, Nondistended; Bowel sounds present  EXTREMITIES:  2+ Peripheral Pulses, No clubbing, cyanosis  NEUROLOGY: Right upper and lower extremity paresis, able to move left upper and lower extremity, aphasic  SKIN: No rashes or lesions    LABS:                    RADIOLOGY & ADDITIONAL TESTS:

## 2018-10-28 NOTE — DISCHARGE NOTE ADULT - ADDITIONAL INSTRUCTIONS
Mechanical soft with thin liquids. Sit upright, small bites, chew mechanical soft solids well, small single cup sips of thin liquids. You may also benefit from speech therapy at your facility or outpatient at [facility] [phone]. Call to make an appointment. Zofran sL before meals, then Mechanical soft with thin liquids. Sit upright, small bites, chew mechanical soft solids well, small single cup sips of thin liquids. You may also benefit from speech therapy at your facility or outpatient at [facility] [phone]. Call to make an appointment. Zofran sublingual three times a day before meals, then Mechanical soft with thin liquids. Sit upright, small bites, chew mechanical soft solids well, small single cup sips of thin liquids. You may also benefit from speech therapy at your facility or outpatient at [facility] [phone].   Please Call to make an appointment. Zofran sublingual three times a day before meals, then Mechanical soft with thin liquids. Sit upright, small bites, chew mechanical soft solids well, small single cup sips of thin liquids. You may also benefit from speech therapy at your facility or outpatient at [facility] [phone].   Please Call to make an appointment.    Please follow up with medical provider at [facility].

## 2018-10-28 NOTE — PROGRESS NOTE ADULT - SUBJECTIVE AND OBJECTIVE BOX
· Subjective and Objective: 	  patient is aphasic - unable to use      HPI:    63y f w PMHx strokes in the past p/w R sided paralysis s/p stroke. Pt had several stroke 15yrs ago and was apparently diagnosed w/ a brain aneurysm, which was stable for many years. She has been out of the country for the last year and 3 months ago had another stroke, which left her paralysed on the R upper and lower extremities and with R sided facial paralysis. No left sided deficits. She arrived in the [place] today and her daughter picked her up from airport, not expecting the extent of neurological deficits. The pt was immediately brought to the ED by her daughter  for evaluation, as she did not know if additional medical care would be required and was not equipped at home to deal with a paralyzed guest.       MEDICATIONS  (STANDING):    MEDICATIONS  (PRN):      PAST MEDICAL & SURGICAL HISTORY:  S/P cholecystectomy      FAMILY HISTORY:      Allergies    No Known Allergies    Intolerances          SHx - No smoking, No ETOH, No drug abuse      Review of Systems:  CONSTITUTIONAL:  No weight loss, fever, chills, weakness or fatigue.  HEENT:  Eyes:  No visual loss, blurred vision, double vision or yellow sclerae. Ears, Nose, Throat:  No hearing loss, sneezing, congestion, runny nose or sore throat.  SKIN:  No rash or itching.  CARDIOVASCULAR:  No chest pain, chest pressure or chest discomfort. No palpitations or edema.  RESPIRATORY:  No shortness of breath, cough or sputum.  GASTROINTESTINAL:  No anorexia, nausea, vomiting or diarrhea. No abdominal pain or blood.  GENITOURINARY:  NO Burning on urination.   NEUROLOGICAL: See HPI  MUSCULOSKELETAL:  No muscle, back pain, joint pain or stiffness.  HEMATOLOGIC:  No anemia, bleeding or bruising.  LYMPHATICS:  No enlarged nodes. No history of splenectomy.  PSYCHIATRIC:  No history of depression or anxiety.  ENDOCRINOLOGIC:  No reports of sweating, cold or heat intolerance. No polyuria or polydipsia.  ALLERGIES:  No history of asthma, hives, eczema or rhinitis.        Vital Signs Last 24 Hrs  T(C): 36.8  HR: 70  BP: 130/60  RR 16    General Exam:   General appearance: No acute distress                   Neurological Exam: limited     Mental Status: alert  awake, minimal verbal output - not making sense, following commands   Cranial Nerves: PERRL, EOMI, CN V1-3 intact to light touch and pinprick.  mild facial asymmetry- right , Tongue midline.    Motor:             Strength: 0/5 on right UE and RLE, 5/5 in LUE and LLE   Tremor: No resting, postural or action tremor.  No myoclonus.  Sensation: intact to light touch on left, intact on right but not reliable   Gait: deferred     Other:    10-22    141  |  103  |  13  ----------------------------<  107<H>  4.1   |  24  |  0.68    Ca    9.6      22 Oct 2018 23:30    TPro  7.6  /  Alb  4.2  /  TBili  0.2  /  DBili  x   /  AST  15  /  ALT  15  /  AlkPhos  86  10-22    10-22    141  |  103  |  13  ----------------------------<  107<H>  4.1   |  24  |  0.68    Ca    9.6      22 Oct 2018 23:30    TPro  7.6  /  Alb  4.2  /  TBili  0.2  /  DBili  x   /  AST  15  /  ALT  15  /  AlkPhos  86  10-22                          13.9   11.01 )-----------( 244      ( 22 Oct 2018 23:30 )             42.1       Radiology    CT head     < from: CT Angio Head w/ IV Cont (10.23.18 @ 02:35) >  CT brain: Post right pterional craniotomy with aneurysm clip in the right   parasellar region. Encephalomalacia in the right inferior frontal and   temporal lobes. Wide region of encephalomalacia in the left frontal lobe   and left basal ganglia likely reflective of an old infarct.  Small chronic appearing left parietal cortical infarct. No gross acute   intracranial hemorrhage or significant mass effect.    CT angiography neck: Completely occluded left internal carotid artery. No   significant stenosis of the cervical right internal carotid artery based   on NASCET criteria. Patent cervical vertebral arteries.    CT angiography brain: Reconstitution of the left internal carotid artery   at the cavernous and supraclinoid segments with trickle flow. Occluded   left anterior cerebral artery. Patent left middle cerebral artery M1 and   M2 inferior division. Occlusion of the superior division of the left   middle cerebral artery. Several poorly evaluated vessels secondary to   streak artifact from the aneurysm clip. MRA may be considered for further   evaluation.    < end of copied text >           < from: CT Angio Head w/ IV Cont (10.23.18 @ 02:35) >  oid mucosal thickening and mucous   retention cysts versus polyps in the right and left maxilla.    CT ANGIOGRAPHY NECK:    Three-vessel aortic arch. The origins of the great vessels are   unremarkable.     The common carotid arteries are patent from the origins to the   bifurcations. There is mild atherosclerotic disease at the common carotid   artery bifurcations. There is occlusion of the left internal carotid  artery from the origin to the skull base. There is mild atherosclerotic   disease of the proximal right internal carotid artery without significant   stenosis based on NASCET criteria. The skull base and intracranial right   carotid artery are patent, however, there is luminal narrowing related to   atherosclerotic calcification of the cavernous and supraclinoid segments.   The cavernous portion of the right internal carotid artery is poorly   visualized secondary to streak artifact from the aneurysm clip. There is   contrast opacification of the proximal right MCA and proximal right VINNY.   There is reconstitution of the cavernous and supraclinoid segments of the   left internal carotid artery with trickle flow. The left A1 and A2   segmentare unopacified. There is enhancement of the left M1 segment and   left MCA inferior division; the superior division branches are   nonvisualized.    The cervical vertebral arteries are patent from the origins to the skull   base. The left vertebral artery is dominant. The intradural vertebral   arteries and proximal basilar artery are patent. There is poor evaluation   of the mid basilar artery due to streak artifact from the aneurysm clip.   There is flow related enhancement in the basilar tip.The proximal PCAs   are patent without significant stenosis. The posterior communicating   arteries are not well visualized.    The regional soft tissues of the neck are otherwise grossly unremarkable.   The lung apices are clear.    There are multilevel degenerative changes of the spine.      IMPRESSION:     CT brain: Post right pterional craniotomy with aneurysm clip in the right   parasellar region. Encephalomalacia in the right inferior frontal and   temporal lobes. Wide region of encephalomalacia in the left frontal lobe   and left basal ganglia likely reflective of an old infarct.  Small chronic appearing left parietal cortical infarct. No gross acute   intracranial hemorrhage or significant mass effect.    CT angiography neck: Completely occluded left internal carotid artery. No   significant stenosis of the cervical right internal carotid artery based   on NASCET criteria. Patent cervical vertebral arteries.    CT angiography brain: Reconstitution of the left internal carotid artery   at the cavernous and supraclinoid segments with trickle flow. Occluded   left anterior cerebral artery. Patent left middle cerebral artery M1 and   M2 inferior division. Occlusion of the superior division of the left   middle cerebral artery. Several poorly evaluated vessels secondary to   streak artifact from the aneurysm clip. MRA may be considered for further   evaluation.              GUCCI SEGUNDO M.D., RADIOLOGY RESIDENT  This document has been electronically signed.  ESTEPHANIA LANGFORD D.O.; ATTENDING RADIOLOGIST  This document has been electronically signed. Oct 23 2018  3:33AM              < end of copied text >          Assessment and Recommendation:   · Assessment		  - unable to use  as patient is aphasic.     63y f w PMHx strokes in the past p/w R sided paralysis s/p stroke. Pt had several stroke 15yrs ago and was apparently diagnosed w/ a brain aneurysm, which was stable for many years. She has been out of the country for the last year and 3 months ago had another stroke, which left her paralysed on the R upper and lower extremities and with R sided facial paralysis. No left sided deficits. She arrived in the [place] today and her daughter picked her up from airport, not expecting the extent of neurological deficits. The pt was immediately brought to the ED by her daughter  for evaluation, as she did not know if additional medical care would be required and was not equipped at home to deal with a paralyzed guest. Neurological examination showed right facial asymmetry, right hemiplegia, aphasia. CT head showed chronic old infarcts in bilateral hemisphere.     Impression     b/l chronic infarct with encephalomalacia     Plan     -Aspirin 81 mg daily  -DVT prophylaxis    -TTE with bubble study  -atorvastatin 80 mg  -telemetry            Electronic Signatures:  Schoenberg, Laura Gray (MD)

## 2018-10-28 NOTE — DISCHARGE NOTE ADULT - MEDICATION SUMMARY - MEDICATIONS TO STOP TAKING
I will STOP taking the medications listed below when I get home from the hospital:    ibuprofen 800 mg oral tablet  -- 1 tab(s) by mouth 2 times a day, As Needed I will STOP taking the medications listed below when I get home from the hospital:    hydroCHLOROthiazide 25 mg oral tablet  -- 1 tab(s) by mouth once a day    NIFEdipine 90 mg oral tablet, extended release  -- 1 tab(s) by mouth once a day    ibuprofen 800 mg oral tablet  -- 1 tab(s) by mouth 2 times a day, As Needed

## 2018-10-28 NOTE — DISCHARGE NOTE ADULT - HOSPITAL COURSE
63 F PMHx HTN, hemorrhagic CVA, brain aneurysm S/P craniotomy with metal plate and clip (placed 12 years ago in hospital in [place])presented with subacute / chronic L CVA with R sided hemiplegia, aphasia likely due to extensive L carotid artery disease.      Cerebrovascular accident (CVA), unspecified mechanism.   -CTA - post R pterional craniotomy with aneurysm clip in the R parasellar region. Encephalomalacia in the R inferior frontal and temporal lobes. Wide region of encephalomalacia in the L frontal lobe and L basal ganglia likely reflective of an old infarct. Small chronic appearing L parietal cortical infarct. No gross acute intracranial hemorrhage or significant mass effect. Completely occluded L internal carotid artery. No significant stenosis of the cervical right internal carotid artery based on NASCET criteria. Patent cervical vertebral arteries. Reconstitution of the left internal carotid artery at the cavernous and supraclinoid segments with trickle flow. Occluded L anterior cerebral artery. Patent L middle cerebral artery M1 and M2 inferior division. Occlusion of the superior division of the L middle cerebral artery. Several poorly evaluated vessels secondary to streak artifact from the aneurysm clip.   -VA Duplex carotid - L internal carotid completely occluded. R internal carotid with mildheterogenous plaque within the proximal vessel, consistentwith a 16-49% stenosis. No hemodynamically significant stenosis.  -ASA, statin   -TTE unremarkable, EF 63%  -Stable, F/U outpatient PCP     Hypertension, unspecified type.  -HCTZ, Procardia and Aldactone.   -Stable, F/U outpatient PCP     Seizure.    -Vimpat   -Stable, F/U outpatient PCP     Discharge to NH 63 F PMHx HTN, hemorrhagic CVA, brain aneurysm S/P craniotomy with metal plate and clip (placed 12 years ago in hospital in [place])presented with subacute / chronic L CVA with R sided hemiplegia, aphasia likely due to extensive L carotid artery disease.      Cerebrovascular accident (CVA), unspecified mechanism.   -CTA - post R pterional craniotomy with aneurysm clip in the R parasellar region. Encephalomalacia in the R inferior frontal and temporal lobes. Wide region of encephalomalacia in the L frontal lobe and L basal ganglia likely reflective of an old infarct. Small chronic appearing L parietal cortical infarct. No gross acute intracranial hemorrhage or significant mass effect. Completely occluded L internal carotid artery. No significant stenosis of the cervical right internal carotid artery based on NASCET criteria. Patent cervical vertebral arteries. Reconstitution of the left internal carotid artery at the cavernous and supraclinoid segments with trickle flow. Occluded L anterior cerebral artery. Patent L middle cerebral artery M1 and M2 inferior division. Occlusion of the superior division of the L middle cerebral artery. Several poorly evaluated vessels secondary to streak artifact from the aneurysm clip.   -VA Duplex carotid - L internal carotid completely occluded. R internal carotid with mildheterogenous plaque within the proximal vessel, consistentwith a 16-49% stenosis. No hemodynamically significant stenosis.  -ASA, statin   -TTE unremarkable, EF 63%  -Stable, F/U outpatient PCP   Patients hospital course was complicated by neuropathic R arm pain and nausea with vomiting, with Transaminitis and leukocytosis. She was treated with Ceftriaxone Iv for possible cholangitis and Ct of A/p ordered. This was difficult to obtained due to retained contrast in bowels. Id was called and determined No CP /sob infectious abddominal process. Antibiotics was stopped. She had S/S eval with rec for dysphagia diet.  Neurontin was given for her Neuropathic R arm Pain. And she was also seen by PT and OT .  With Zofran Tid before meals, she was able to do Po intake of food.  D/c to REHAB.    Hypertension, unspecified type.  -HCTZ, Procardia and Aldactone.   -Stable, F/U outpatient PCP     Seizure.    -Vimpat   -Stable, F/U outpatient PCP     Discharge to NH 63 F PMHx HTN, hemorrhagic CVA, brain aneurysm S/P craniotomy with metal plate and clip (placed 12 years ago in hospital in [place])presented with subacute / chronic L CVA with R sided hemiplegia, aphasia likely due to extensive L carotid artery disease.      Cerebrovascular accident (CVA), unspecified mechanism.   -CTA - post R pterional craniotomy with aneurysm clip in the R parasellar region. Encephalomalacia in the R inferior frontal and temporal lobes. Wide region of encephalomalacia in the L frontal lobe and L basal ganglia likely reflective of an old infarct. Small chronic appearing L parietal cortical infarct. No gross acute intracranial hemorrhage or significant mass effect. Completely occluded L internal carotid artery. No significant stenosis of the cervical right internal carotid artery based on NASCET criteria. Patent cervical vertebral arteries. Reconstitution of the left internal carotid artery at the cavernous and supraclinoid segments with trickle flow. Occluded L anterior cerebral artery. Patent L middle cerebral artery M1 and M2 inferior division. Occlusion of the superior division of the L middle cerebral artery. Several poorly evaluated vessels secondary to streak artifact from the aneurysm clip.   -VA Duplex carotid - L internal carotid completely occluded. R internal carotid with mildheterogenous plaque within the proximal vessel, consistentwith a 16-49% stenosis. No hemodynamically significant stenosis.  -ASA, statin   -TTE unremarkable, EF 63%  -Stable, F/U outpatient PCP   Patients hospital course was complicated by neuropathic R arm pain and nausea with vomiting, with Transaminitis and leukocytosis. She was treated with Ceftriaxone Iv for possible cholangitis and Ct of A/p ordered. This was difficult to obtained due to retained contrast in bowels. Id was called and determined No CP /sob infectious abddominal process. Antibiotics was stopped. She had S/S eval with rec for dysphagia diet.  Neurontin was given for her Neuropathic R arm Pain. And she was also seen by PT and OT .  With Zofran Tid before meals, she was able to do Po intake of food.  D/c to REHAB.    Hypertension, unspecified type.  -Clonidine patch and aldactonr  -Stable, F/U outpatient PCP     Seizure.    -Vimpat solution 100 mg q12  -Stable, F/U outpatient PCP     Discharge to NH 63 F PMHx HTN, hemorrhagic CVA, brain aneurysm S/P craniotomy with metal plate and clip (placed 12 years ago in hospital in [place])presented with subacute / chronic L CVA with R sided hemiplegia, aphasia likely due to extensive L carotid artery disease.      Cerebrovascular accident (CVA), unspecified mechanism.   -CTA - post R pterional craniotomy with aneurysm clip in the R parasellar region. Encephalomalacia in the R inferior frontal and temporal lobes. Wide region of encephalomalacia in the L frontal lobe and L basal ganglia likely reflective of an old infarct. Small chronic appearing L parietal cortical infarct. No gross acute intracranial hemorrhage or significant mass effect. Completely occluded L internal carotid artery. No significant stenosis of the cervical right internal carotid artery based on NASCET criteria. Patent cervical vertebral arteries. Reconstitution of the left internal carotid artery at the cavernous and supraclinoid segments with trickle flow. Occluded L anterior cerebral artery. Patent L middle cerebral artery M1 and M2 inferior division. Occlusion of the superior division of the L middle cerebral artery. Several poorly evaluated vessels secondary to streak artifact from the aneurysm clip.   -VA Duplex carotid - L internal carotid completely occluded. R internal carotid with mildheterogenous plaque within the proximal vessel, consistentwith a 16-49% stenosis. No hemodynamically significant stenosis.  -ASA, statin   -TTE unremarkable, EF 63%  -Stable, F/U outpatient PCP   Patients hospital course was complicated by neuropathic R arm pain and nausea with vomiting, with Transaminitis and leukocytosis. She was treated with Ceftriaxone Iv for possible cholangitis and Ct of A/p ordered. This was difficult to obtained due to retained contrast in bowels. Id was called and determined No CP /sob infectious abddominal process. Antibiotics was stopped. She had S/S eval with rec for dysphagia diet.  Neurontin was given for her Neuropathic R arm Pain. And she was also seen by PT and OT .  With Zofran Tid before meals, she was able to do Po intake of food.  D/c to REHAB.    Hypertension, unspecified type.  - Continue Clonidine patch and aldactone  -Stable, F/U outpatient PCP     Seizure.    - Continue Vimpat solution 100 mg q12  -Stable, F/U outpatient PCP     Discharge to Nursing home 63 F PMHx HTN, hemorrhagic CVA, brain aneurysm S/P craniotomy with metal plate and clip (placed 12 years ago in hospital in [place])presented with subacute / chronic L CVA with R sided hemiplegia, aphasia likely due to extensive L carotid artery disease.      Cerebrovascular accident (CVA), unspecified mechanism.   -CTA - post R pterional craniotomy with aneurysm clip in the R parasellar region. Encephalomalacia in the R inferior frontal and temporal lobes. Wide region of encephalomalacia in the L frontal lobe and L basal ganglia likely reflective of an old infarct. Small chronic appearing L parietal cortical infarct. No gross acute intracranial hemorrhage or significant mass effect. Completely occluded L internal carotid artery. No significant stenosis of the cervical right internal carotid artery based on NASCET criteria. Patent cervical vertebral arteries. Reconstitution of the left internal carotid artery at the cavernous and supraclinoid segments with trickle flow. Occluded L anterior cerebral artery. Patent L middle cerebral artery M1 and M2 inferior division. Occlusion of the superior division of the L middle cerebral artery. Several poorly evaluated vessels secondary to streak artifact from the aneurysm clip.   -VA Duplex carotid - L internal carotid completely occluded. R internal carotid with mildheterogenous plaque within the proximal vessel, consistentwith a 16-49% stenosis. No hemodynamically significant stenosis.  -ASA, statin   -TTE unremarkable, EF 63%  -Stable, F/U outpatient PCP   -Patients hospital course was complicated by neuropathic R arm pain and nausea with vomiting, with Transaminitis and leukocytosis. She was treated with Ceftriaxone Iv for possible cholangitis and Ct of A/p ordered. This was difficult to obtained due to retained contrast in bowels. Id was called and determined No CP /sob infectious abddominal process. Antibiotics was stopped. She had S/S eval with rec for dysphagia diet.  Neurontin was given for her Neuropathic R arm Pain. And she was also seen by PT and OT .  With Zofran Tid before meals, she was able to do Po intake of food.  D/c to REHAB.    Hypertension, unspecified type.  - Continue Clonidine patch and aldactone  -Stable, F/U outpatient PCP     Seizure.    - Continue Vimpat solution 100 mg q12h and gabapentin 300 mg q12h   -Stable, F/U outpatient PCP     Per Dr. Gil pt medically stable for discharge to Nursing home

## 2018-10-28 NOTE — DISCHARGE NOTE ADULT - PATIENT PORTAL LINK FT
You can access the [facility] Patient Portal, offered by [facility], by registering with the following website: [email or web address]

## 2018-10-28 NOTE — DISCHARGE NOTE ADULT - PLAN OF CARE
Stable You were seen by Vascular surgery and no acute intervention was recommended. Continue with supportive care, assistance with activities of daily living. Continue with ASA, statin, good blood pressure and glucose control. Follow up providers at facility within 1 week for further management. Continue blood pressure medication regimen as directed. Monitor for any visual changes, headaches or dizziness.  Monitor blood pressure regularly.  Follow up with your PCP for further management for high blood pressure. Continue anti-seizure medications as recommended. Stable. Follow up providers at facility within 1 week for further management. Stable- history of CVA Continue anti-seizure medications as recommended. Stable. Follow up providers at facility within 1 week for further management. You may also follow up with your neurologist Dr. Sullivan at [facility]. Continue zofran sublingual prior to meals. You were seen by speech and swallow for difficulty tolerating foods- recommended pureed diet with thin liquids.

## 2018-10-28 NOTE — DISCHARGE NOTE ADULT - INSTRUCTIONS
Mechanical soft with thin liquids. Sit upright, small bites, chew mechanical soft solids well, small single cup sips of thin liquids. You may also benefit from speech therapy at your facility or outpatient at [facility] [phone]. Call to make an appointment. Please do Zofran Sl pills 3x/day before meals to minimize Nausea that is chronic.  Mechanical soft with thin liquids. Sit upright, small bites, chew mechanical soft solids well, small single cup sips of thin liquids.   You may also benefit from speech therapy at your facility or outpatient at [facility] [phone]. Call to make an appointment. Please take Zofran sublingual 3 times a day  before meals to minimize Nausea.  Mechanical soft with thin liquids. Sit upright, small bites, chew mechanical soft solids well, small single cup sips of thin liquids.   You may also benefit from speech therapy at your facility or outpatient at [facility] [phone].   Please Call to make an appointment.

## 2018-10-29 LAB
ALBUMIN SERPL ELPH-MCNC: 4.4 G/DL — SIGNIFICANT CHANGE UP (ref 3.3–5)
ALP SERPL-CCNC: 108 U/L — SIGNIFICANT CHANGE UP (ref 40–120)
ALT FLD-CCNC: 31 U/L — SIGNIFICANT CHANGE UP (ref 4–33)
AMYLASE P1 CFR SERPL: 38 U/L — SIGNIFICANT CHANGE UP (ref 25–125)
AST SERPL-CCNC: 30 U/L — SIGNIFICANT CHANGE UP (ref 4–32)
BILIRUB SERPL-MCNC: 0.5 MG/DL — SIGNIFICANT CHANGE UP (ref 0.2–1.2)
BUN SERPL-MCNC: 36 MG/DL — HIGH (ref 7–23)
CALCIUM SERPL-MCNC: 10 MG/DL — SIGNIFICANT CHANGE UP (ref 8.4–10.5)
CHLORIDE SERPL-SCNC: 98 MMOL/L — SIGNIFICANT CHANGE UP (ref 98–107)
CO2 SERPL-SCNC: 18 MMOL/L — LOW (ref 22–31)
CREAT SERPL-MCNC: 0.74 MG/DL — SIGNIFICANT CHANGE UP (ref 0.5–1.3)
GLUCOSE SERPL-MCNC: 134 MG/DL — HIGH (ref 70–99)
LIDOCAIN IGE QN: 19.3 U/L — SIGNIFICANT CHANGE UP (ref 7–60)
MAGNESIUM SERPL-MCNC: 2.1 MG/DL — SIGNIFICANT CHANGE UP (ref 1.6–2.6)
PHOSPHATE SERPL-MCNC: 3.8 MG/DL — SIGNIFICANT CHANGE UP (ref 2.5–4.5)
POTASSIUM SERPL-MCNC: 3.5 MMOL/L — SIGNIFICANT CHANGE UP (ref 3.5–5.3)
POTASSIUM SERPL-SCNC: 3.5 MMOL/L — SIGNIFICANT CHANGE UP (ref 3.5–5.3)
PROT SERPL-MCNC: 8 G/DL — SIGNIFICANT CHANGE UP (ref 6–8.3)
SODIUM SERPL-SCNC: 136 MMOL/L — SIGNIFICANT CHANGE UP (ref 135–145)

## 2018-10-29 PROCEDURE — 99233 SBSQ HOSP IP/OBS HIGH 50: CPT

## 2018-10-29 RX ORDER — ONDANSETRON 8 MG/1
4 TABLET, FILM COATED ORAL EVERY 8 HOURS
Qty: 0 | Refills: 0 | Status: DISCONTINUED | OUTPATIENT
Start: 2018-10-29 | End: 2018-11-08

## 2018-10-29 RX ORDER — SODIUM CHLORIDE 9 MG/ML
1000 INJECTION, SOLUTION INTRAVENOUS
Qty: 0 | Refills: 0 | Status: DISCONTINUED | OUTPATIENT
Start: 2018-10-29 | End: 2018-11-01

## 2018-10-29 RX ORDER — MORPHINE SULFATE 50 MG/1
2 CAPSULE, EXTENDED RELEASE ORAL EVERY 6 HOURS
Qty: 0 | Refills: 0 | Status: DISCONTINUED | OUTPATIENT
Start: 2018-10-29 | End: 2018-10-29

## 2018-10-29 RX ADMIN — Medication 650 MILLIGRAM(S): at 06:50

## 2018-10-29 RX ADMIN — Medication 650 MILLIGRAM(S): at 05:57

## 2018-10-29 RX ADMIN — LACOSAMIDE 100 MILLIGRAM(S): 50 TABLET ORAL at 05:58

## 2018-10-29 RX ADMIN — ONDANSETRON 4 MILLIGRAM(S): 8 TABLET, FILM COATED ORAL at 14:34

## 2018-10-29 RX ADMIN — HEPARIN SODIUM 5000 UNIT(S): 5000 INJECTION INTRAVENOUS; SUBCUTANEOUS at 14:00

## 2018-10-29 RX ADMIN — Medication 25 MILLIGRAM(S): at 05:58

## 2018-10-29 RX ADMIN — LACOSAMIDE 100 MILLIGRAM(S): 50 TABLET ORAL at 17:25

## 2018-10-29 RX ADMIN — Medication 81 MILLIGRAM(S): at 13:59

## 2018-10-29 RX ADMIN — HEPARIN SODIUM 5000 UNIT(S): 5000 INJECTION INTRAVENOUS; SUBCUTANEOUS at 05:57

## 2018-10-29 RX ADMIN — Medication 90 MILLIGRAM(S): at 05:58

## 2018-10-29 RX ADMIN — HEPARIN SODIUM 5000 UNIT(S): 5000 INJECTION INTRAVENOUS; SUBCUTANEOUS at 22:32

## 2018-10-29 RX ADMIN — ATORVASTATIN CALCIUM 80 MILLIGRAM(S): 80 TABLET, FILM COATED ORAL at 22:32

## 2018-10-29 RX ADMIN — SPIRONOLACTONE 25 MILLIGRAM(S): 25 TABLET, FILM COATED ORAL at 05:58

## 2018-10-29 RX ADMIN — SODIUM CHLORIDE 70 MILLILITER(S): 9 INJECTION, SOLUTION INTRAVENOUS at 14:34

## 2018-10-29 NOTE — CHART NOTE - NSCHARTNOTEFT_GEN_A_CORE
2 pm   Called by RN for N/v  Patient vomited yellow vomit.  Patient non- verbal    PE  Vital Signs Last 24 Hrs  T(F): 98.2 (29 Oct 2018 06:20), P 95, /84, Sat 97 % RA, R 18  lungs -cta  Cor rrr No  m/r/g  Abd- pos epigastric pain on pal- No  R/G  Ext R arm and leg 0/5 strength.    D/p  N/v and epigastric pain  NPO  IVF  Iv zofran prn.  Labs- cbc with diff, cmp, lipase/ amylase  will get ct of A with contrast to asses onc CMP is back  d/w RN and NP

## 2018-10-29 NOTE — PROGRESS NOTE ADULT - PROBLEM SELECTOR PLAN 1
subacute stroke with extensive left carotid dz.   clip was placed 12 years ago in a hospital in [place], unclear if compatible with MRI but will not  at this time. CTA H/N done instead. No vascular intervention  c/w ASA 81mg daily, statin, dysphagia diet  TTE unremarkable, EF: 63%  Awaiting rehab

## 2018-10-29 NOTE — PROGRESS NOTE ADULT - SUBJECTIVE AND OBJECTIVE BOX
Medicine Accept Note:  Patient is a 63y old  Female who presents with a chief complaint of Unable to ambulate (28 Oct 2018 14:21)      SUBJECTIVE / OVERNIGHT EVENTS:    MEDICATIONS  (STANDING):  aspirin enteric coated 81 milliGRAM(s) Oral daily  atorvastatin 80 milliGRAM(s) Oral at bedtime  heparin  Injectable 5000 Unit(s) SubCutaneous every 8 hours  hydrochlorothiazide 25 milliGRAM(s) Oral daily  influenza   Vaccine 0.5 milliLiter(s) IntraMuscular once  lacosamide 100 milliGRAM(s) Oral two times a day  NIFEdipine XL 90 milliGRAM(s) Oral daily  spironolactone 25 milliGRAM(s) Oral daily    MEDICATIONS  (PRN):  acetaminophen   Tablet .. 650 milliGRAM(s) Oral every 6 hours PRN Temp greater or equal to 38C (100.4F), Mild Pain (1 - 3), Moderate Pain (4 - 6)        CAPILLARY BLOOD GLUCOSE        I&O's Summary      PHYSICAL EXAM:  Vital Signs Last 24 Hrs  T(C): 36.8 (29 Oct 2018 06:20), Max: 36.9 (28 Oct 2018 20:24)  T(F): 98.2 (29 Oct 2018 06:20), Max: 98.5 (28 Oct 2018 20:24)  HR: 95 (29 Oct 2018 06:20) (80 - 95)  BP: 138/84 (29 Oct 2018 06:20) (130/91 - 138/84)  BP(mean): --  RR: 18 (29 Oct 2018 06:20) (18 - 18)  SpO2: 97% (29 Oct 2018 06:20) (95% - 97%)  GENERAL: NAD, well-developed  HEAD:  Atraumatic, Normocephalic  EYES: EOMI, PERRLA, conjunctiva and sclera clear  NECK: Supple, No JVD  CHEST/LUNG: Clear to auscultation bilaterally; No wheeze  HEART: Regular rate and rhythm; No murmurs, rubs, or gallops  ABDOMEN: Soft, Nontender, Nondistended; Bowel sounds present  EXTREMITIES:  2+ Peripheral Pulses, No clubbing, cyanosis, or edema  PSYCH: Alert    LABS:                    RADIOLOGY & ADDITIONAL TESTS:    Imaging Personally Reviewed:    Consultant(s) Notes Reviewed:      Care Discussed with Consultants/Other Providers: Medicine Accept Note:  Patient is a 63y old  Female who presents with a chief complaint of Unable to ambulate (28 Oct 2018 14:21)      SUBJECTIVE / OVERNIGHT EVENTS:  Patient looks comfortable- non -verbal. nods her head and follows instructions .    MEDICATIONS  (STANDING):  aspirin enteric coated 81 milliGRAM(s) Oral daily  atorvastatin 80 milliGRAM(s) Oral at bedtime  heparin  Injectable 5000 Unit(s) SubCutaneous every 8 hours  hydrochlorothiazide 25 milliGRAM(s) Oral daily  influenza   Vaccine 0.5 milliLiter(s) IntraMuscular once  lacosamide 100 milliGRAM(s) Oral two times a day  NIFEdipine XL 90 milliGRAM(s) Oral daily  spironolactone 25 milliGRAM(s) Oral daily    MEDICATIONS  (PRN):  acetaminophen   Tablet .. 650 milliGRAM(s) Oral every 6 hours PRN Temp greater or equal to 38C (100.4F), Mild Pain (1 - 3), Moderate Pain (4 - 6)        PHYSICAL EXAM:  Vital Signs Last 24 Hrs  T(C): 36.8 (29 Oct 2018 06:20), Max: 36.9 (28 Oct 2018 20:24)  T(F): 98.2 (29 Oct 2018 06:20), Max: 98.5 (28 Oct 2018 20:24)  HR: 95 (29 Oct 2018 06:20) (80 - 95)  BP: 138/84 (29 Oct 2018 06:20) (130/91 - 138/84)  BP(mean): --  RR: 18 (29 Oct 2018 06:20) (18 - 18)  SpO2: 97% (29 Oct 2018 06:20) (95% - 97%)  GENERAL: NAD, well-developed  HEAD:  Atraumatic, Normocephalic  EYES: EOMI, PERRLA, conjunctiva and sclera clear  NECK: Supple, No JVD  CHEST/LUNG: Clear to auscultation bilaterally; No wheeze  HEART: Regular rate and rhythm; No murmurs, rubs, or gallops  ABDOMEN: Soft, Nontender, Nondistended; Bowel sounds present  EXTREMITIES:  2+ Peripheral Pulses, No clubbing, cyanosis, or edema  R hand   No strength to R arm and leg  PSYCH: Alert    LABS:        RADIOLOGY & ADDITIONAL TESTS:    Imaging Personally Reviewed:    Consultant(s) Notes Reviewed:      Care Discussed with Consultants/Other Providers:

## 2018-10-30 DIAGNOSIS — R10.31 RIGHT LOWER QUADRANT PAIN: ICD-10-CM

## 2018-10-30 LAB
ALBUMIN SERPL ELPH-MCNC: 4.1 G/DL — SIGNIFICANT CHANGE UP (ref 3.3–5)
ALP SERPL-CCNC: 98 U/L — SIGNIFICANT CHANGE UP (ref 40–120)
ALT FLD-CCNC: 36 U/L — HIGH (ref 4–33)
AST SERPL-CCNC: 36 U/L — HIGH (ref 4–32)
BASOPHILS # BLD AUTO: 0.04 K/UL — SIGNIFICANT CHANGE UP (ref 0–0.2)
BASOPHILS NFR BLD AUTO: 0.3 % — SIGNIFICANT CHANGE UP (ref 0–2)
BILIRUB SERPL-MCNC: 0.7 MG/DL — SIGNIFICANT CHANGE UP (ref 0.2–1.2)
BUN SERPL-MCNC: 31 MG/DL — HIGH (ref 7–23)
CALCIUM SERPL-MCNC: 9.6 MG/DL — SIGNIFICANT CHANGE UP (ref 8.4–10.5)
CHLORIDE SERPL-SCNC: 97 MMOL/L — LOW (ref 98–107)
CO2 SERPL-SCNC: 21 MMOL/L — LOW (ref 22–31)
CREAT SERPL-MCNC: 0.8 MG/DL — SIGNIFICANT CHANGE UP (ref 0.5–1.3)
EOSINOPHIL # BLD AUTO: 0.09 K/UL — SIGNIFICANT CHANGE UP (ref 0–0.5)
EOSINOPHIL NFR BLD AUTO: 0.6 % — SIGNIFICANT CHANGE UP (ref 0–6)
GLUCOSE SERPL-MCNC: 137 MG/DL — HIGH (ref 70–99)
HCT VFR BLD CALC: 44.7 % — SIGNIFICANT CHANGE UP (ref 34.5–45)
HGB BLD-MCNC: 15.4 G/DL — SIGNIFICANT CHANGE UP (ref 11.5–15.5)
IMM GRANULOCYTES # BLD AUTO: 0.05 # — SIGNIFICANT CHANGE UP
IMM GRANULOCYTES NFR BLD AUTO: 0.3 % — SIGNIFICANT CHANGE UP (ref 0–1.5)
LYMPHOCYTES # BLD AUTO: 19.5 % — SIGNIFICANT CHANGE UP (ref 13–44)
LYMPHOCYTES # BLD AUTO: 2.96 K/UL — SIGNIFICANT CHANGE UP (ref 1–3.3)
MAGNESIUM SERPL-MCNC: 2 MG/DL — SIGNIFICANT CHANGE UP (ref 1.6–2.6)
MCHC RBC-ENTMCNC: 33 PG — SIGNIFICANT CHANGE UP (ref 27–34)
MCHC RBC-ENTMCNC: 34.5 % — SIGNIFICANT CHANGE UP (ref 32–36)
MCV RBC AUTO: 95.9 FL — SIGNIFICANT CHANGE UP (ref 80–100)
MONOCYTES # BLD AUTO: 1.59 K/UL — HIGH (ref 0–0.9)
MONOCYTES NFR BLD AUTO: 10.5 % — SIGNIFICANT CHANGE UP (ref 2–14)
NEUTROPHILS # BLD AUTO: 10.46 K/UL — HIGH (ref 1.8–7.4)
NEUTROPHILS NFR BLD AUTO: 68.8 % — SIGNIFICANT CHANGE UP (ref 43–77)
NRBC # FLD: 0 — SIGNIFICANT CHANGE UP
PHOSPHATE SERPL-MCNC: 3.2 MG/DL — SIGNIFICANT CHANGE UP (ref 2.5–4.5)
PLATELET # BLD AUTO: 226 K/UL — SIGNIFICANT CHANGE UP (ref 150–400)
PMV BLD: 11.3 FL — SIGNIFICANT CHANGE UP (ref 7–13)
POTASSIUM SERPL-MCNC: 3 MMOL/L — LOW (ref 3.5–5.3)
POTASSIUM SERPL-SCNC: 3 MMOL/L — LOW (ref 3.5–5.3)
PROT SERPL-MCNC: 7.4 G/DL — SIGNIFICANT CHANGE UP (ref 6–8.3)
RBC # BLD: 4.66 M/UL — SIGNIFICANT CHANGE UP (ref 3.8–5.2)
RBC # FLD: 11.7 % — SIGNIFICANT CHANGE UP (ref 10.3–14.5)
SODIUM SERPL-SCNC: 134 MMOL/L — LOW (ref 135–145)
WBC # BLD: 15.19 K/UL — HIGH (ref 3.8–10.5)
WBC # FLD AUTO: 15.19 K/UL — HIGH (ref 3.8–10.5)

## 2018-10-30 PROCEDURE — 99233 SBSQ HOSP IP/OBS HIGH 50: CPT

## 2018-10-30 PROCEDURE — 99232 SBSQ HOSP IP/OBS MODERATE 35: CPT | Mod: GC

## 2018-10-30 PROCEDURE — 76705 ECHO EXAM OF ABDOMEN: CPT | Mod: 26

## 2018-10-30 RX ORDER — POTASSIUM CHLORIDE 20 MEQ
10 PACKET (EA) ORAL
Qty: 0 | Refills: 0 | Status: DISCONTINUED | OUTPATIENT
Start: 2018-10-30 | End: 2018-10-30

## 2018-10-30 RX ORDER — CEFTRIAXONE 500 MG/1
1 INJECTION, POWDER, FOR SOLUTION INTRAMUSCULAR; INTRAVENOUS EVERY 24 HOURS
Qty: 0 | Refills: 0 | Status: DISCONTINUED | OUTPATIENT
Start: 2018-10-31 | End: 2018-11-02

## 2018-10-30 RX ORDER — CEFTRIAXONE 500 MG/1
INJECTION, POWDER, FOR SOLUTION INTRAMUSCULAR; INTRAVENOUS
Qty: 0 | Refills: 0 | Status: DISCONTINUED | OUTPATIENT
Start: 2018-10-30 | End: 2018-11-02

## 2018-10-30 RX ORDER — MORPHINE SULFATE 50 MG/1
1 CAPSULE, EXTENDED RELEASE ORAL ONCE
Qty: 0 | Refills: 0 | Status: DISCONTINUED | OUTPATIENT
Start: 2018-10-30 | End: 2018-10-30

## 2018-10-30 RX ORDER — CEFTRIAXONE 500 MG/1
1 INJECTION, POWDER, FOR SOLUTION INTRAMUSCULAR; INTRAVENOUS ONCE
Qty: 0 | Refills: 0 | Status: COMPLETED | OUTPATIENT
Start: 2018-10-30 | End: 2018-10-30

## 2018-10-30 RX ORDER — POTASSIUM CHLORIDE 20 MEQ
10 PACKET (EA) ORAL
Qty: 0 | Refills: 0 | Status: COMPLETED | OUTPATIENT
Start: 2018-10-30 | End: 2018-10-30

## 2018-10-30 RX ADMIN — MORPHINE SULFATE 1 MILLIGRAM(S): 50 CAPSULE, EXTENDED RELEASE ORAL at 04:18

## 2018-10-30 RX ADMIN — Medication 90 MILLIGRAM(S): at 05:50

## 2018-10-30 RX ADMIN — HEPARIN SODIUM 5000 UNIT(S): 5000 INJECTION INTRAVENOUS; SUBCUTANEOUS at 22:18

## 2018-10-30 RX ADMIN — Medication 81 MILLIGRAM(S): at 11:29

## 2018-10-30 RX ADMIN — Medication 25 MILLIGRAM(S): at 05:50

## 2018-10-30 RX ADMIN — HEPARIN SODIUM 5000 UNIT(S): 5000 INJECTION INTRAVENOUS; SUBCUTANEOUS at 14:22

## 2018-10-30 RX ADMIN — Medication 100 MILLIEQUIVALENT(S): at 12:55

## 2018-10-30 RX ADMIN — ONDANSETRON 4 MILLIGRAM(S): 8 TABLET, FILM COATED ORAL at 03:38

## 2018-10-30 RX ADMIN — CEFTRIAXONE 100 GRAM(S): 500 INJECTION, POWDER, FOR SOLUTION INTRAMUSCULAR; INTRAVENOUS at 14:19

## 2018-10-30 RX ADMIN — HEPARIN SODIUM 5000 UNIT(S): 5000 INJECTION INTRAVENOUS; SUBCUTANEOUS at 05:50

## 2018-10-30 RX ADMIN — MORPHINE SULFATE 1 MILLIGRAM(S): 50 CAPSULE, EXTENDED RELEASE ORAL at 04:35

## 2018-10-30 RX ADMIN — Medication 100 MILLIEQUIVALENT(S): at 11:25

## 2018-10-30 RX ADMIN — ATORVASTATIN CALCIUM 80 MILLIGRAM(S): 80 TABLET, FILM COATED ORAL at 22:18

## 2018-10-30 RX ADMIN — LACOSAMIDE 100 MILLIGRAM(S): 50 TABLET ORAL at 05:50

## 2018-10-30 RX ADMIN — SPIRONOLACTONE 25 MILLIGRAM(S): 25 TABLET, FILM COATED ORAL at 05:50

## 2018-10-30 RX ADMIN — Medication 100 MILLIEQUIVALENT(S): at 16:20

## 2018-10-30 RX ADMIN — LACOSAMIDE 100 MILLIGRAM(S): 50 TABLET ORAL at 18:06

## 2018-10-30 NOTE — PROGRESS NOTE ADULT - SUBJECTIVE AND OBJECTIVE BOX
63F remote hemorrhagic stroke due to ruptured aneursym, h/o clip ~12 yrs ago in [place], [place].  Minimal to no residual deficits after that remote stroke.  Now s/p stroke on ~7/28/18 while in [place] () with severe expressive aphasia, mild dysphagia (cleared for [facility] soft with thin liquids), and dense R hemiplegia.  After her acute hospitalization, she received some outpt rehab in , but no inpt rehab.  There were logisitical delays in repatriating to the [place], which was the family's goal, in order to optimize care and be covered by insurance.  The pt has not had significant functional recovery over the last 3 months.  She has been requiring required max to total assist for basic self care and mobility, which is consistent with the findings here at [facility].  She has been unable to ambulate since the July 2018 stroke.          CURRENT FUNCTIONAL STATUS      REVIEW OF SYSTEMS  + abdominal pain, P CT, unable to fully obtain       RECENT LABS/IMAGING  CBC Full  -  ( 30 Oct 2018 05:30 )  WBC Count : 15.19 K/uL  Hemoglobin : 15.4 g/dL  Hematocrit : 44.7 %  Platelet Count - Automated : 226 K/uL  Mean Cell Volume : 95.9 fL  Mean Cell Hemoglobin : 33.0 pg  Mean Cell Hemoglobin Concentration : 34.5 %  Auto Neutrophil # : 10.46 K/uL  Auto Lymphocyte # : 2.96 K/uL  Auto Monocyte # : 1.59 K/uL  Auto Eosinophil # : 0.09 K/uL  Auto Basophil # : 0.04 K/uL  Auto Neutrophil % : 68.8 %  Auto Lymphocyte % : 19.5 %  Auto Monocyte % : 10.5 %  Auto Eosinophil % : 0.6 %  Auto Basophil % : 0.3 %    10-30    134<L>  |  97<L>  |  31<H>  ----------------------------<  137<H>  3.0<L>   |  21<L>  |  0.80    Ca    9.6      30 Oct 2018 05:30  Phos  3.2     10-30  Mg     2.0     10-30    TPro  7.4  /  Alb  4.1  /  TBili  0.7  /  DBili  x   /  AST  36<H>  /  ALT  36<H>  /  AlkPhos  98  10-30        VITALS  T(C): 37.2 (10-30-18 @ 13:24), Max: 37.4 (10-29-18 @ 21:26)  HR: 76 (10-30-18 @ 13:24) (76 - 88)  BP: 143/89 (10-30-18 @ 13:24) (109/71 - 143/89)  RR: 18 (10-30-18 @ 13:24) (18 - 18)  SpO2: 98% (10-30-18 @ 13:24) (98% - 100%)  Wt(kg): --    MEDICATIONS   acetaminophen   Tablet .. 650 milliGRAM(s) every 6 hours PRN  aspirin enteric coated 81 milliGRAM(s) daily  atorvastatin 80 milliGRAM(s) at bedtime  cefTRIAXone   IVPB     dextrose 5%. 1000 milliLiter(s) <Continuous>  heparin  Injectable 5000 Unit(s) every 8 hours  hydrochlorothiazide 25 milliGRAM(s) daily  influenza   Vaccine 0.5 milliLiter(s) once  lacosamide 100 milliGRAM(s) two times a day  NIFEdipine XL 90 milliGRAM(s) daily  ondansetron Injectable 4 milliGRAM(s) every 8 hours PRN  potassium chloride  10 mEq/100 mL IVPB 10 milliEquivalent(s) every 1 hour  spironolactone 25 milliGRAM(s) daily      PHYSICAL EXAM  Constitutional - NAD, Comfortable  HEENT - NCAT, EOMI  Neck - Supple, No limited ROM  Chest - CTA bilaterally, No wheeze, No rhonchi, No crackles  Cardiovascular - RRR, S1S2, No murmurs  Abdomen - BS+, Soft, NTND  Extremities - No C/C/E, No calf tenderness   Neurologic Exam -                 + aphasia      Motor -        0/5 on right UE and RLE, 5/5 in LUE and LLE        Sensory - Intact to LT     Reflexes - DTR Intact, No primitive reflexive     Balance -unable to test   Psychiatric - Mood stable, Affect WNL    --------------------------------------------------------------------

## 2018-10-30 NOTE — PROGRESS NOTE ADULT - PROBLEM SELECTOR PLAN 1
subacute stroke with extensive left carotid dz.   clip was placed 12 years ago in a hospital in [place], unclear if compatible with MRI but will not  at this time. CTA H/N done instead. No vascular intervention  c/w ASA 81mg daily, statin, dysphagia diet  TTE unremarkable, EF: 63%  Awaiting rehab

## 2018-10-30 NOTE — CHART NOTE - NSCHARTNOTEFT_GEN_A_CORE
ADS NIGHT COVERAGE    Notified by RN that patient moaning in bed, unable to fully assess as patient nonverbal. Pt seen and examined at bedside. Pt points to stomach when asked about pain. Admits to nausea but denies vomiting.     PHYSICAL EXAM:  GENERAL: NAD, well-groomed, well-developed  CHEST/LUNG: Clear to percussion bilaterally; No rales, rhonchi, wheezing, or rubs  HEART: Regular rate and rhythm; No murmurs, rubs, or gallops  ABDOMEN: Soft, diffusely tender throughout, Nondistended; Bowel sounds present    Vital Signs Last 24 Hrs  T(C): 37.4 (29 Oct 2018 21:26), Max: 37.4 (29 Oct 2018 21:26)  T(F): 99.3 (29 Oct 2018 21:26), Max: 99.3 (29 Oct 2018 21:26)  HR: 88 (29 Oct 2018 21:26) (88 - 95)  BP: 109/71 (29 Oct 2018 21:26) (109/71 - 138/84)  BP(mean): --  RR: 18 (29 Oct 2018 21:26) (18 - 18)  SpO2: 99% (29 Oct 2018 21:26) (97% - 99%)    63F with h/o HTN, hemorrhagic cva, brain aneurysm s/p craniotomy with metal plate and clip presented with subacute / chronic L CVA with right sided hemiplegia, aphasia likley due to  extensive left carotid artery dz.   1) Abd pain  -NPO  -CT abd w/ contrast  -Zofran PRN nausea/vomiting  -Morphine 1mg for pain  -Will continue to monitor patient    ANDRIA Head PA-C  [id number]

## 2018-10-30 NOTE — PROGRESS NOTE ADULT - SUBJECTIVE AND OBJECTIVE BOX
Patient is a 63y old  Female who presents with a chief complaint of unable to ambulate (30 Oct 2018 10:32)      SUBJECTIVE / OVERNIGHT EVENTS:  Patient points to RUQ when asked about pain.    MEDICATIONS  (STANDING):  aspirin enteric coated 81 milliGRAM(s) Oral daily  atorvastatin 80 milliGRAM(s) Oral at bedtime  cefTRIAXone   IVPB      cefTRIAXone   IVPB 1 Gram(s) IV Intermittent once  dextrose 5%. 1000 milliLiter(s) (70 mL/Hr) IV Continuous <Continuous>  heparin  Injectable 5000 Unit(s) SubCutaneous every 8 hours  hydrochlorothiazide 25 milliGRAM(s) Oral daily  influenza   Vaccine 0.5 milliLiter(s) IntraMuscular once  lacosamide 100 milliGRAM(s) Oral two times a day  NIFEdipine XL 90 milliGRAM(s) Oral daily  potassium chloride  10 mEq/100 mL IVPB 10 milliEquivalent(s) IV Intermittent every 1 hour  spironolactone 25 milliGRAM(s) Oral daily    MEDICATIONS  (PRN):  acetaminophen   Tablet .. 650 milliGRAM(s) Oral every 6 hours PRN Temp greater or equal to 38C (100.4F), Mild Pain (1 - 3), Moderate Pain (4 - 6)  ondansetron Injectable 4 milliGRAM(s) IV Push every 8 hours PRN Nausea and/or Vomiting    PHYSICAL EXAM:  Vital Signs Last 24 Hrs  T(C): 36.9 (30 Oct 2018 05:29), Max: 37.4 (29 Oct 2018 21:26)  T(F): 98.5 (30 Oct 2018 05:29), Max: 99.3 (29 Oct 2018 21:26)  HR: 88 (30 Oct 2018 05:29) (88 - 94)  BP: 136/87 (30 Oct 2018 05:29) (109/71 - 136/87)  BP(mean): --  RR: 18 (30 Oct 2018 05:29) (18 - 18)  SpO2: 100% (30 Oct 2018 05:29) (97% - 100%)  GENERAL: NAD, well-developed  HEAD:  Atraumatic, Normocephalic  EYES: EOMI, PERRLA, conjunctiva and sclera clear  NECK: Supple, No JVD  CHEST/LUNG: Clear to auscultation bilaterally; No wheeze  HEART: Regular rate and rhythm; No murmurs, rubs, or gallops  ABDOMEN: Soft, Bowel sounds present  RUQ discomfort on palp- No rebound or gaurding  EXTREMITIES:  2+ Peripheral Pulses, No clubbing, cyanosis, or edema  PSYCH: Alert  NEUROLOGY: R side weakness 0/5 to ext  R hand- contracted      LABS:                        15.4   15.19 )-----------( 226      ( 30 Oct 2018 05:30 )             44.7     10-30    134<L>  |  97<L>  |  31<H>  ----------------------------<  137<H>  3.0<L>   |  21<L>  |  0.80    Ca    9.6      30 Oct 2018 05:30  Phos  3.2     10-30  Mg     2.0     10-30    TPro  7.4  /  Alb  4.1  /  TBili  0.7  /  DBili  x   /  AST  36<H>  /  ALT  36<H>  /  AlkPhos  98  10-30      RADIOLOGY & ADDITIONAL TESTS:    Imaging Personally Reviewed:    Consultant(s) Notes Reviewed:      Care Discussed with Consultants/Other Providers:

## 2018-10-31 DIAGNOSIS — R11.0 NAUSEA: ICD-10-CM

## 2018-10-31 LAB
ALBUMIN SERPL ELPH-MCNC: 4.1 G/DL — SIGNIFICANT CHANGE UP (ref 3.3–5)
ALP SERPL-CCNC: 103 U/L — SIGNIFICANT CHANGE UP (ref 40–120)
ALT FLD-CCNC: 45 U/L — HIGH (ref 4–33)
AST SERPL-CCNC: 37 U/L — HIGH (ref 4–32)
BILIRUB SERPL-MCNC: 0.7 MG/DL — SIGNIFICANT CHANGE UP (ref 0.2–1.2)
BUN SERPL-MCNC: 18 MG/DL — SIGNIFICANT CHANGE UP (ref 7–23)
CALCIUM SERPL-MCNC: 9.5 MG/DL — SIGNIFICANT CHANGE UP (ref 8.4–10.5)
CHLORIDE SERPL-SCNC: 94 MMOL/L — LOW (ref 98–107)
CO2 SERPL-SCNC: 22 MMOL/L — SIGNIFICANT CHANGE UP (ref 22–31)
CREAT SERPL-MCNC: 0.8 MG/DL — SIGNIFICANT CHANGE UP (ref 0.5–1.3)
GLUCOSE SERPL-MCNC: 131 MG/DL — HIGH (ref 70–99)
MAGNESIUM SERPL-MCNC: 2 MG/DL — SIGNIFICANT CHANGE UP (ref 1.6–2.6)
PHOSPHATE SERPL-MCNC: 3.4 MG/DL — SIGNIFICANT CHANGE UP (ref 2.5–4.5)
POTASSIUM SERPL-MCNC: 3.1 MMOL/L — LOW (ref 3.5–5.3)
POTASSIUM SERPL-SCNC: 3.1 MMOL/L — LOW (ref 3.5–5.3)
PROT SERPL-MCNC: 7.8 G/DL — SIGNIFICANT CHANGE UP (ref 6–8.3)
SODIUM SERPL-SCNC: 133 MMOL/L — LOW (ref 135–145)

## 2018-10-31 PROCEDURE — 99233 SBSQ HOSP IP/OBS HIGH 50: CPT

## 2018-10-31 RX ORDER — POLYETHYLENE GLYCOL 3350 17 G/17G
17 POWDER, FOR SOLUTION ORAL DAILY
Qty: 0 | Refills: 0 | Status: DISCONTINUED | OUTPATIENT
Start: 2018-10-31 | End: 2018-11-08

## 2018-10-31 RX ORDER — POTASSIUM CHLORIDE 20 MEQ
10 PACKET (EA) ORAL
Qty: 0 | Refills: 0 | Status: COMPLETED | OUTPATIENT
Start: 2018-10-31 | End: 2018-10-31

## 2018-10-31 RX ORDER — DOCUSATE SODIUM 100 MG
100 CAPSULE ORAL THREE TIMES A DAY
Qty: 0 | Refills: 0 | Status: DISCONTINUED | OUTPATIENT
Start: 2018-10-31 | End: 2018-11-08

## 2018-10-31 RX ORDER — SENNA PLUS 8.6 MG/1
2 TABLET ORAL AT BEDTIME
Qty: 0 | Refills: 0 | Status: DISCONTINUED | OUTPATIENT
Start: 2018-10-31 | End: 2018-11-08

## 2018-10-31 RX ORDER — ONDANSETRON 8 MG/1
4 TABLET, FILM COATED ORAL ONCE
Qty: 0 | Refills: 0 | Status: COMPLETED | OUTPATIENT
Start: 2018-10-31 | End: 2018-10-31

## 2018-10-31 RX ADMIN — ONDANSETRON 4 MILLIGRAM(S): 8 TABLET, FILM COATED ORAL at 03:09

## 2018-10-31 RX ADMIN — ATORVASTATIN CALCIUM 80 MILLIGRAM(S): 80 TABLET, FILM COATED ORAL at 21:33

## 2018-10-31 RX ADMIN — LACOSAMIDE 100 MILLIGRAM(S): 50 TABLET ORAL at 17:05

## 2018-10-31 RX ADMIN — HEPARIN SODIUM 5000 UNIT(S): 5000 INJECTION INTRAVENOUS; SUBCUTANEOUS at 06:42

## 2018-10-31 RX ADMIN — Medication 90 MILLIGRAM(S): at 06:42

## 2018-10-31 RX ADMIN — HEPARIN SODIUM 5000 UNIT(S): 5000 INJECTION INTRAVENOUS; SUBCUTANEOUS at 13:09

## 2018-10-31 RX ADMIN — Medication 81 MILLIGRAM(S): at 12:36

## 2018-10-31 RX ADMIN — ONDANSETRON 4 MILLIGRAM(S): 8 TABLET, FILM COATED ORAL at 09:10

## 2018-10-31 RX ADMIN — Medication 100 MILLIEQUIVALENT(S): at 15:30

## 2018-10-31 RX ADMIN — CEFTRIAXONE 100 GRAM(S): 500 INJECTION, POWDER, FOR SOLUTION INTRAMUSCULAR; INTRAVENOUS at 13:08

## 2018-10-31 RX ADMIN — Medication 100 MILLIEQUIVALENT(S): at 09:04

## 2018-10-31 RX ADMIN — Medication 650 MILLIGRAM(S): at 21:29

## 2018-10-31 RX ADMIN — SPIRONOLACTONE 25 MILLIGRAM(S): 25 TABLET, FILM COATED ORAL at 06:42

## 2018-10-31 RX ADMIN — SODIUM CHLORIDE 70 MILLILITER(S): 9 INJECTION, SOLUTION INTRAVENOUS at 09:06

## 2018-10-31 RX ADMIN — Medication 25 MILLIGRAM(S): at 06:42

## 2018-10-31 RX ADMIN — Medication 100 MILLIEQUIVALENT(S): at 12:35

## 2018-10-31 RX ADMIN — HEPARIN SODIUM 5000 UNIT(S): 5000 INJECTION INTRAVENOUS; SUBCUTANEOUS at 21:33

## 2018-10-31 RX ADMIN — Medication 650 MILLIGRAM(S): at 20:29

## 2018-10-31 RX ADMIN — LACOSAMIDE 100 MILLIGRAM(S): 50 TABLET ORAL at 06:42

## 2018-10-31 NOTE — PROGRESS NOTE ADULT - PROBLEM SELECTOR PLAN 1
subacute stroke with extensive left carotid dz.   clip was placed 12 years ago in a hospital in [place], unclear if compatible with MRI but will not  at this time. CTA H/N done instead. No vascular intervention  c/w ASA 81mg daily, statin, dysphagia diet  TTE unremarkable, EF: 63%  Awaiting rehab

## 2018-10-31 NOTE — CHART NOTE - NSCHARTNOTEFT_GEN_A_CORE
Patient seen for LOS. RDN reviewed chart, per chart patient is Serbian speaking and aphasic- No family at bedside. RDN made multiple attempts to speak with patient however patient refused  services. RDN will follow up at a later time as long as patient is amenable.

## 2018-10-31 NOTE — PROGRESS NOTE ADULT - SUBJECTIVE AND OBJECTIVE BOX
Patient is a 63y old  Female who presents with a chief complaint of unable to ambulate (30 Oct 2018 15:50)      SUBJECTIVE / OVERNIGHT EVENTS:  Still with Nausea- going down for CT today    MEDICATIONS  (STANDING):  aspirin enteric coated 81 milliGRAM(s) Oral daily  atorvastatin 80 milliGRAM(s) Oral at bedtime  cefTRIAXone   IVPB      cefTRIAXone   IVPB 1 Gram(s) IV Intermittent every 24 hours  dextrose 5%. 1000 milliLiter(s) (70 mL/Hr) IV Continuous <Continuous>  heparin  Injectable 5000 Unit(s) SubCutaneous every 8 hours  hydrochlorothiazide 25 milliGRAM(s) Oral daily  influenza   Vaccine 0.5 milliLiter(s) IntraMuscular once  lacosamide 100 milliGRAM(s) Oral two times a day  NIFEdipine XL 90 milliGRAM(s) Oral daily  potassium chloride  10 mEq/100 mL IVPB 10 milliEquivalent(s) IV Intermittent every 1 hour  spironolactone 25 milliGRAM(s) Oral daily    MEDICATIONS  (PRN):  acetaminophen   Tablet .. 650 milliGRAM(s) Oral every 6 hours PRN Temp greater or equal to 38C (100.4F), Mild Pain (1 - 3), Moderate Pain (4 - 6)  ondansetron Injectable 4 milliGRAM(s) IV Push every 8 hours PRN Nausea and/or Vomiting          PHYSICAL EXAM:  Vital Signs Last 24 Hrs  T(C): 36.9 (31 Oct 2018 05:40), Max: 37.7 (30 Oct 2018 22:00)  T(F): 98.4 (31 Oct 2018 05:40), Max: 99.8 (30 Oct 2018 22:00)  HR: 80 (31 Oct 2018 05:40) (76 - 90)  BP: 151/90 (31 Oct 2018 05:40) (143/89 - 151/90)  BP(mean): --  RR: 18 (31 Oct 2018 05:40) (18 - 18)  SpO2: 98% (31 Oct 2018 05:40) (98% - 99%)  GENERAL: NAD, well-developed  HEAD:  Atraumatic, Normocephalic  EYES: EOMI, PERRLA, conjunctiva and sclera clear  NECK: Supple, No JVD  CHEST/LUNG: Clear to auscultation bilaterally; No wheeze  HEART: Regular rate and rhythm; No murmurs, rubs, or gallops  ABDOMEN: Soft, Nontender, Nondistended; Bowel sounds present  EXTREMITIES:  2+ Peripheral Pulses, No clubbing, cyanosis, or edema  R arm contracted  PSYCH: Alert  NEUROLOGY: 0/5 strength to R arm and L arm    LABS:                        15.4   15.19 )-----------( 226      ( 30 Oct 2018 05:30 )             44.7     10-31    133<L>  |  94<L>  |  18  ----------------------------<  131<H>  3.1<L>   |  22  |  0.80    Ca    9.5      31 Oct 2018 05:40  Phos  3.4     10-31  Mg     2.0     10-31    TPro  7.8  /  Alb  4.1  /  TBili  0.7  /  DBili  x   /  AST  37<H>  /  ALT  45<H>  /  AlkPhos  103  10-31      RADIOLOGY & ADDITIONAL TESTS:    Imaging Personally Reviewed:    Consultant(s) Notes Reviewed:      Care Discussed with Consultants/Other Providers:

## 2018-11-01 DIAGNOSIS — E87.1 HYPO-OSMOLALITY AND HYPONATREMIA: ICD-10-CM

## 2018-11-01 LAB
ALBUMIN SERPL ELPH-MCNC: 4.3 G/DL — SIGNIFICANT CHANGE UP (ref 3.3–5)
ALBUMIN SERPL ELPH-MCNC: 4.3 G/DL — SIGNIFICANT CHANGE UP (ref 3.3–5)
ALP SERPL-CCNC: 115 U/L — SIGNIFICANT CHANGE UP (ref 40–120)
ALP SERPL-CCNC: 115 U/L — SIGNIFICANT CHANGE UP (ref 40–120)
ALT FLD-CCNC: 57 U/L — HIGH (ref 4–33)
ALT FLD-CCNC: 57 U/L — HIGH (ref 4–33)
AST SERPL-CCNC: 54 U/L — HIGH (ref 4–32)
AST SERPL-CCNC: 54 U/L — HIGH (ref 4–32)
BASOPHILS # BLD AUTO: 0.07 K/UL — SIGNIFICANT CHANGE UP (ref 0–0.2)
BASOPHILS NFR BLD AUTO: 0.5 % — SIGNIFICANT CHANGE UP (ref 0–2)
BILIRUB SERPL-MCNC: 0.8 MG/DL — SIGNIFICANT CHANGE UP (ref 0.2–1.2)
BILIRUB SERPL-MCNC: 0.8 MG/DL — SIGNIFICANT CHANGE UP (ref 0.2–1.2)
BUN SERPL-MCNC: 18 MG/DL — SIGNIFICANT CHANGE UP (ref 7–23)
CALCIUM SERPL-MCNC: 9.5 MG/DL — SIGNIFICANT CHANGE UP (ref 8.4–10.5)
CHLORIDE SERPL-SCNC: 91 MMOL/L — LOW (ref 98–107)
CO2 SERPL-SCNC: 18 MMOL/L — LOW (ref 22–31)
CREAT SERPL-MCNC: 0.81 MG/DL — SIGNIFICANT CHANGE UP (ref 0.5–1.3)
EOSINOPHIL # BLD AUTO: 0.17 K/UL — SIGNIFICANT CHANGE UP (ref 0–0.5)
EOSINOPHIL NFR BLD AUTO: 1.2 % — SIGNIFICANT CHANGE UP (ref 0–6)
GLUCOSE SERPL-MCNC: 181 MG/DL — HIGH (ref 70–99)
HCT VFR BLD CALC: 46.4 % — HIGH (ref 34.5–45)
HGB BLD-MCNC: 16.4 G/DL — HIGH (ref 11.5–15.5)
IMM GRANULOCYTES # BLD AUTO: 0.06 # — SIGNIFICANT CHANGE UP
IMM GRANULOCYTES NFR BLD AUTO: 0.4 % — SIGNIFICANT CHANGE UP (ref 0–1.5)
LYMPHOCYTES # BLD AUTO: 27 % — SIGNIFICANT CHANGE UP (ref 13–44)
LYMPHOCYTES # BLD AUTO: 3.82 K/UL — HIGH (ref 1–3.3)
MAGNESIUM SERPL-MCNC: 2 MG/DL — SIGNIFICANT CHANGE UP (ref 1.6–2.6)
MCHC RBC-ENTMCNC: 32.8 PG — SIGNIFICANT CHANGE UP (ref 27–34)
MCHC RBC-ENTMCNC: 35.3 % — SIGNIFICANT CHANGE UP (ref 32–36)
MCV RBC AUTO: 92.8 FL — SIGNIFICANT CHANGE UP (ref 80–100)
MONOCYTES # BLD AUTO: 1.27 K/UL — HIGH (ref 0–0.9)
MONOCYTES NFR BLD AUTO: 9 % — SIGNIFICANT CHANGE UP (ref 2–14)
NEUTROPHILS # BLD AUTO: 8.74 K/UL — HIGH (ref 1.8–7.4)
NEUTROPHILS NFR BLD AUTO: 61.9 % — SIGNIFICANT CHANGE UP (ref 43–77)
NRBC # FLD: 0 — SIGNIFICANT CHANGE UP
PHOSPHATE SERPL-MCNC: 3.4 MG/DL — SIGNIFICANT CHANGE UP (ref 2.5–4.5)
PLATELET # BLD AUTO: 279 K/UL — SIGNIFICANT CHANGE UP (ref 150–400)
PMV BLD: 11.6 FL — SIGNIFICANT CHANGE UP (ref 7–13)
POTASSIUM SERPL-MCNC: 3 MMOL/L — LOW (ref 3.5–5.3)
POTASSIUM SERPL-SCNC: 3 MMOL/L — LOW (ref 3.5–5.3)
PROT SERPL-MCNC: 8.2 G/DL — SIGNIFICANT CHANGE UP (ref 6–8.3)
PROT SERPL-MCNC: 8.2 G/DL — SIGNIFICANT CHANGE UP (ref 6–8.3)
RBC # BLD: 5 M/UL — SIGNIFICANT CHANGE UP (ref 3.8–5.2)
RBC # FLD: 11.2 % — SIGNIFICANT CHANGE UP (ref 10.3–14.5)
SODIUM SERPL-SCNC: 130 MMOL/L — LOW (ref 135–145)
WBC # BLD: 14.13 K/UL — HIGH (ref 3.8–10.5)
WBC # FLD AUTO: 14.13 K/UL — HIGH (ref 3.8–10.5)

## 2018-11-01 PROCEDURE — 74018 RADEX ABDOMEN 1 VIEW: CPT | Mod: 26

## 2018-11-01 PROCEDURE — 99233 SBSQ HOSP IP/OBS HIGH 50: CPT

## 2018-11-01 PROCEDURE — 12345: CPT | Mod: NC

## 2018-11-01 RX ORDER — LACOSAMIDE 50 MG/1
100 TABLET ORAL
Qty: 0 | Refills: 0 | Status: DISCONTINUED | OUTPATIENT
Start: 2018-11-01 | End: 2018-11-03

## 2018-11-01 RX ORDER — MORPHINE SULFATE 50 MG/1
0.5 CAPSULE, EXTENDED RELEASE ORAL ONCE
Qty: 0 | Refills: 0 | Status: DISCONTINUED | OUTPATIENT
Start: 2018-11-01 | End: 2018-11-01

## 2018-11-01 RX ORDER — LACTULOSE 10 G/15ML
10 SOLUTION ORAL EVERY 6 HOURS
Qty: 0 | Refills: 0 | Status: DISCONTINUED | OUTPATIENT
Start: 2018-11-01 | End: 2018-11-07

## 2018-11-01 RX ORDER — DEXTROSE MONOHYDRATE, SODIUM CHLORIDE, AND POTASSIUM CHLORIDE 50; .745; 4.5 G/1000ML; G/1000ML; G/1000ML
1000 INJECTION, SOLUTION INTRAVENOUS
Qty: 0 | Refills: 0 | Status: DISCONTINUED | OUTPATIENT
Start: 2018-11-01 | End: 2018-11-03

## 2018-11-01 RX ORDER — POTASSIUM CHLORIDE 20 MEQ
10 PACKET (EA) ORAL
Qty: 0 | Refills: 0 | Status: COMPLETED | OUTPATIENT
Start: 2018-11-01 | End: 2018-11-01

## 2018-11-01 RX ADMIN — ATORVASTATIN CALCIUM 80 MILLIGRAM(S): 80 TABLET, FILM COATED ORAL at 22:09

## 2018-11-01 RX ADMIN — Medication 650 MILLIGRAM(S): at 10:15

## 2018-11-01 RX ADMIN — HEPARIN SODIUM 5000 UNIT(S): 5000 INJECTION INTRAVENOUS; SUBCUTANEOUS at 14:09

## 2018-11-01 RX ADMIN — Medication 100 MILLIGRAM(S): at 14:10

## 2018-11-01 RX ADMIN — MORPHINE SULFATE 0.5 MILLIGRAM(S): 50 CAPSULE, EXTENDED RELEASE ORAL at 00:52

## 2018-11-01 RX ADMIN — SPIRONOLACTONE 25 MILLIGRAM(S): 25 TABLET, FILM COATED ORAL at 05:08

## 2018-11-01 RX ADMIN — HEPARIN SODIUM 5000 UNIT(S): 5000 INJECTION INTRAVENOUS; SUBCUTANEOUS at 05:09

## 2018-11-01 RX ADMIN — Medication 25 MILLIGRAM(S): at 05:09

## 2018-11-01 RX ADMIN — Medication 81 MILLIGRAM(S): at 14:08

## 2018-11-01 RX ADMIN — Medication 100 MILLIEQUIVALENT(S): at 11:30

## 2018-11-01 RX ADMIN — POLYETHYLENE GLYCOL 3350 17 GRAM(S): 17 POWDER, FOR SOLUTION ORAL at 14:11

## 2018-11-01 RX ADMIN — Medication 100 MILLIEQUIVALENT(S): at 09:34

## 2018-11-01 RX ADMIN — LACOSAMIDE 100 MILLIGRAM(S): 50 TABLET ORAL at 19:28

## 2018-11-01 RX ADMIN — LACTULOSE 10 GRAM(S): 10 SOLUTION ORAL at 19:26

## 2018-11-01 RX ADMIN — CEFTRIAXONE 100 GRAM(S): 500 INJECTION, POWDER, FOR SOLUTION INTRAMUSCULAR; INTRAVENOUS at 14:12

## 2018-11-01 RX ADMIN — MORPHINE SULFATE 0.5 MILLIGRAM(S): 50 CAPSULE, EXTENDED RELEASE ORAL at 00:37

## 2018-11-01 RX ADMIN — Medication 90 MILLIGRAM(S): at 05:09

## 2018-11-01 RX ADMIN — Medication 100 MILLIEQUIVALENT(S): at 14:06

## 2018-11-01 RX ADMIN — LACOSAMIDE 100 MILLIGRAM(S): 50 TABLET ORAL at 05:09

## 2018-11-01 RX ADMIN — Medication 650 MILLIGRAM(S): at 09:32

## 2018-11-01 RX ADMIN — LACTULOSE 10 GRAM(S): 10 SOLUTION ORAL at 22:08

## 2018-11-01 RX ADMIN — DEXTROSE MONOHYDRATE, SODIUM CHLORIDE, AND POTASSIUM CHLORIDE 70 MILLILITER(S): 50; .745; 4.5 INJECTION, SOLUTION INTRAVENOUS at 19:24

## 2018-11-01 RX ADMIN — HEPARIN SODIUM 5000 UNIT(S): 5000 INJECTION INTRAVENOUS; SUBCUTANEOUS at 22:09

## 2018-11-01 RX ADMIN — Medication 100 MILLIGRAM(S): at 22:09

## 2018-11-01 RX ADMIN — ONDANSETRON 4 MILLIGRAM(S): 8 TABLET, FILM COATED ORAL at 04:27

## 2018-11-01 NOTE — PROGRESS NOTE ADULT - PROBLEM SELECTOR PLAN 5
Ct of a/p ord.  Ceftriaxone started ?? Cholangitis.  Cannot do Ct until barium is gone.  Lactulose until BM- F/u xray in 4 8 hrs

## 2018-11-01 NOTE — DIETITIAN INITIAL EVALUATION ADULT. - PERTINENT MEDS FT
MEDICATIONS  (STANDING):  aspirin enteric coated 81 milliGRAM(s) Oral daily  atorvastatin 80 milliGRAM(s) Oral at bedtime  cefTRIAXone   IVPB      cefTRIAXone   IVPB 1 Gram(s) IV Intermittent every 24 hours  dextrose 5% + sodium chloride 0.9% with potassium chloride 20 mEq/L 1000 milliLiter(s) (70 mL/Hr) IV Continuous <Continuous>  docusate sodium 100 milliGRAM(s) Oral three times a day  heparin  Injectable 5000 Unit(s) SubCutaneous every 8 hours  influenza   Vaccine 0.5 milliLiter(s) IntraMuscular once  lacosamide 100 milliGRAM(s) Oral two times a day  lactulose Syrup 10 Gram(s) Oral every 6 hours  NIFEdipine XL 90 milliGRAM(s) Oral daily  polyethylene glycol 3350 17 Gram(s) Oral daily  senna 2 Tablet(s) Oral at bedtime  spironolactone 25 milliGRAM(s) Oral daily    MEDICATIONS  (PRN):  acetaminophen   Tablet .. 650 milliGRAM(s) Oral every 6 hours PRN Temp greater or equal to 38C (100.4F), Mild Pain (1 - 3), Moderate Pain (4 - 6)  ondansetron Injectable 4 milliGRAM(s) IV Push every 8 hours PRN Nausea and/or Vomiting

## 2018-11-01 NOTE — PROGRESS NOTE ADULT - ATTENDING COMMENTS
Xray of Abd to f/u barium.  Start clear liquids Xray of Abd to f/u barium.  Start clear liquids  Patient is non-verbal  Still notes some Nausea.  No more abdominal pain- only R arm pain- this is old since CVA.  Spoke with daughter Yamileth at [phone]- patient had stoke 3 months ago in [place]- R side hemiplegia is NOT new also R ARM pain is NOT NEW.  What is new is the N/V.  Still awaiting Ct of A/p  Clear liquid diet restarted.    Addendum  4 pm- tolerating clears- will advance diet .

## 2018-11-01 NOTE — DIETITIAN INITIAL EVALUATION ADULT. - PROBLEM SELECTOR PLAN 1
subacute stroke with extensive left carotid dz.   Neurology following, f/u final recs  MRA following confirmation of compatibility of aneurysm clip with MR  check lipid panel, HbA1c  PT, swallow evaluation.  D5W while awaiting swallow eval.

## 2018-11-01 NOTE — PROGRESS NOTE ADULT - SUBJECTIVE AND OBJECTIVE BOX
Patient is a 63y old  Female who presents with a chief complaint of unable to ambulate (01 Nov 2018 11:34)      SUBJECTIVE / OVERNIGHT EVENTS:  Patient is non-verbal  Still notes some Nausea    MEDICATIONS  (STANDING):  aspirin enteric coated 81 milliGRAM(s) Oral daily  atorvastatin 80 milliGRAM(s) Oral at bedtime  cefTRIAXone   IVPB      cefTRIAXone   IVPB 1 Gram(s) IV Intermittent every 24 hours  dextrose 5% + sodium chloride 0.9% with potassium chloride 20 mEq/L 1000 milliLiter(s) (70 mL/Hr) IV Continuous <Continuous>  docusate sodium 100 milliGRAM(s) Oral three times a day  heparin  Injectable 5000 Unit(s) SubCutaneous every 8 hours  influenza   Vaccine 0.5 milliLiter(s) IntraMuscular once  lacosamide 100 milliGRAM(s) Oral two times a day  lactulose Syrup 10 Gram(s) Oral every 6 hours  NIFEdipine XL 90 milliGRAM(s) Oral daily  polyethylene glycol 3350 17 Gram(s) Oral daily  senna 2 Tablet(s) Oral at bedtime  spironolactone 25 milliGRAM(s) Oral daily    MEDICATIONS  (PRN):  acetaminophen   Tablet .. 650 milliGRAM(s) Oral every 6 hours PRN Temp greater or equal to 38C (100.4F), Mild Pain (1 - 3), Moderate Pain (4 - 6)  ondansetron Injectable 4 milliGRAM(s) IV Push every 8 hours PRN Nausea and/or Vomiting        PHYSICAL EXAM:  Vital Signs Last 24 Hrs  T(C): 36.9 (01 Nov 2018 14:05), Max: 36.9 (31 Oct 2018 20:30)  T(F): 98.4 (01 Nov 2018 14:05), Max: 98.5 (31 Oct 2018 20:30)  HR: 71 (01 Nov 2018 14:05) (71 - 104)  BP: 119/81 (01 Nov 2018 14:05) (116/77 - 156/97)  BP(mean): --  RR: 19 (01 Nov 2018 14:05) (18 - 24)  SpO2: 97% (01 Nov 2018 14:05) (95% - 100%)  GENERAL: NAD, well-developed  HEAD:  Atraumatic, Normocephalic  EYES: EOMI, PERRLA, conjunctiva and sclera clear  NECK: Supple, No JVD  CHEST/LUNG: Clear to auscultation bilaterally; No wheeze  HEART: Regular rate and rhythm; No murmurs, rubs, or gallops  ABDOMEN: Soft, Nontender, Nondistended; Bowel sounds present  EXTREMITIES:  2+ Peripheral Pulses, No clubbing, cyanosis, or edema  PSYCH: AAOx3  NEUROLOGY: non-focal  SKIN: No rashes or lesions    LABS:                        16.4   14.13 )-----------( 279      ( 01 Nov 2018 05:45 )             46.4     11-01    130<L>  |  91<L>  |  18  ----------------------------<  181<H>  3.0<L>   |  18<L>  |  0.81    Ca    9.5      01 Nov 2018 05:45  Phos  3.4     11-01  Mg     2.0     11-01    TPro  8.2  /  Alb  4.3  /  TBili  0.8  /  DBili  x   /  AST  54<H>  /  ALT  57<H>  /  AlkPhos  115  11-01              RADIOLOGY & ADDITIONAL TESTS:    Imaging Personally Reviewed:    Consultant(s) Notes Reviewed:      Care Discussed with Consultants/Other Providers: Patient is a 63y old  Female who presents with a chief complaint of unable to ambulate (01 Nov 2018 11:34)      SUBJECTIVE / OVERNIGHT EVENTS:  Patient is non-verbal  Still notes some Nausea.  No more abdominal pain- only R arm pain- this is old since CVA.  Spoke with daughter Yamileth at [phone]- patient had stoke 3 months ago in [place]- R side hemiplegia is NOT new also R ARM pain is NOT NEW.  What is new is the N/V.  Still awaiting Ct of A/p  Clear liquid diet restarted.    MEDICATIONS  (STANDING):  aspirin enteric coated 81 milliGRAM(s) Oral daily  atorvastatin 80 milliGRAM(s) Oral at bedtime  cefTRIAXone   IVPB      cefTRIAXone   IVPB 1 Gram(s) IV Intermittent every 24 hours  dextrose 5% + sodium chloride 0.9% with potassium chloride 20 mEq/L 1000 milliLiter(s) (70 mL/Hr) IV Continuous <Continuous>  docusate sodium 100 milliGRAM(s) Oral three times a day  heparin  Injectable 5000 Unit(s) SubCutaneous every 8 hours  influenza   Vaccine 0.5 milliLiter(s) IntraMuscular once  lacosamide 100 milliGRAM(s) Oral two times a day  lactulose Syrup 10 Gram(s) Oral every 6 hours  NIFEdipine XL 90 milliGRAM(s) Oral daily  polyethylene glycol 3350 17 Gram(s) Oral daily  senna 2 Tablet(s) Oral at bedtime  spironolactone 25 milliGRAM(s) Oral daily    MEDICATIONS  (PRN):  acetaminophen   Tablet .. 650 milliGRAM(s) Oral every 6 hours PRN Temp greater or equal to 38C (100.4F), Mild Pain (1 - 3), Moderate Pain (4 - 6)  ondansetron Injectable 4 milliGRAM(s) IV Push every 8 hours PRN Nausea and/or Vomiting        PHYSICAL EXAM:  Vital Signs Last 24 Hrs  T(C): 36.9 (01 Nov 2018 14:05), Max: 36.9 (31 Oct 2018 20:30)  T(F): 98.4 (01 Nov 2018 14:05), Max: 98.5 (31 Oct 2018 20:30)  HR: 71 (01 Nov 2018 14:05) (71 - 104)  BP: 119/81 (01 Nov 2018 14:05) (116/77 - 156/97)  BP(mean): --  RR: 19 (01 Nov 2018 14:05) (18 - 24)  SpO2: 97% (01 Nov 2018 14:05) (95% - 100%)  GENERAL: NAD, well-developed  HEAD:  Atraumatic, Normocephalic  EYES: EOMI, PERRLA, conjunctiva and sclera clear  NECK: Supple, No JVD  CHEST/LUNG: Clear to auscultation bilaterally; No wheeze  HEART: Regular rate and rhythm; No murmurs, rubs, or gallops  ABDOMEN: Soft, Nontender, Nondistended; Bowel sounds present  EXTREMITIES:  2+ Peripheral Pulses, No clubbing, cyanosis, or edema  PSYCH: AAOx3  NEUROLOGY: non-focal  SKIN: No rashes or lesions    LABS:                        16.4   14.13 )-----------( 279      ( 01 Nov 2018 05:45 )             46.4     11-01    130<L>  |  91<L>  |  18  ----------------------------<  181<H>  3.0<L>   |  18<L>  |  0.81    Ca    9.5      01 Nov 2018 05:45  Phos  3.4     11-01  Mg     2.0     11-01    TPro  8.2  /  Alb  4.3  /  TBili  0.8  /  DBili  x   /  AST  54<H>  /  ALT  57<H>  /  AlkPhos  115  11-01              RADIOLOGY & ADDITIONAL TESTS:    Imaging Personally Reviewed:    Consultant(s) Notes Reviewed:      Care Discussed with Consultants/Other Providers:

## 2018-11-01 NOTE — DIETITIAN INITIAL EVALUATION ADULT. - SOURCE
patient/electronic chart, RN, daughter Yamileth [phone]/family/significant other/other (specify)

## 2018-11-01 NOTE — PROVIDER CONTACT NOTE (OTHER) - SITUATION
Patient noted to be in pain, nods head when asked if in pain, points to stomach. Patient last received tylenol around 20:30pm

## 2018-11-01 NOTE — DIETITIAN INITIAL EVALUATION ADULT. - ENERGY NEEDS
Current weight 169.9lbs Ht 64" BMI 29.2kg/m2  IBW: 120lbs (+/-10%) %IBW: 142%  +1 left hand, leg edema noted. Skin intact.

## 2018-11-01 NOTE — DIETITIAN INITIAL EVALUATION ADULT. - PERTINENT LABORATORY DATA
11-01 Na130 mmol/L<L> Glu 181 mg/dL<H> K+ 3.0 mmol/L<L> Cr  0.81 mg/dL BUN 18 mg/dL 11-01 Phos 3.4 mg/dL 11-01 Alb 4.3 g/dL 10-24 [id number] 5.7 %<H> 10-24 Chol 163 mg/dL  mg/dL HDL 34 mg/dL<L> Trig 115 mg/dL

## 2018-11-01 NOTE — PROGRESS NOTE ADULT - PROBLEM SELECTOR PLAN 1
subacute stroke with extensive left carotid dz.   clip was placed 12 years ago in a hospital in [place], unclear if compatible with MRI but will not  at this time. CTA H/N done instead. No vascular intervention  c/w ASA 81mg daily, statin, dysphagia diet  TTE unremarkable, EF: 63%  Awaiting rehab

## 2018-11-01 NOTE — DIETITIAN INITIAL EVALUATION ADULT. - NS AS NUTRI INTERV MEALS SNACK
Composition of meals/snacks/1. Suggest advance diet as medically feasible to Mechanical Soft, thin liquids as per SLP recommendations. 2. Monitor weights, labs, BM's, skin integrity, PO tolerance and p.o. intake

## 2018-11-01 NOTE — DIETITIAN INITIAL EVALUATION ADULT. - OTHER INFO
Initial Dietitian Evaluation 2/2 to extended length of stay. 63Female with h/o HTN, hemorrhagic cva, brain aneurysm s/p craniotomy with metal plate and clip presented with subacute / chronic L CVA with right sided hemiplegia and aphasia. Nutrition history obtained from daughter at home. Patient NPO at time of visit. Now, patient started on clear liquids. S/p Cinesophagram 10/25/18- recommended Mechanical Soft with Thin Liquids. Per daughter, patient usually weighs 170lbs, denies any weight changes. Patient weighs 169.9lbs (10/28). NKFA. No nausea, vomiting, constipation or diarrhea reported. Initial Dietitian Evaluation 2/2 to extended length of stay. 62 y/o Female with h/o HTN, hemorrhagic cva, brain aneurysm s/p craniotomy with metal plate and clip presented with subacute / chronic L CVA with right sided hemiplegia and aphasia. Nutrition history obtained from daughter at home. Patient NPO at time of visit. Now, patient started on clear liquids. S/p Cinesophagram 10/25/18- recommended Mechanical Soft with Thin Liquids. Per daughter, patient usually weighs 170lbs, denies any weight changes. Patient weighs 169.9lbs (10/28). NKFA. No nausea, vomiting, constipation or diarrhea reported.

## 2018-11-02 LAB
ALBUMIN SERPL ELPH-MCNC: 3.9 G/DL — SIGNIFICANT CHANGE UP (ref 3.3–5)
ALP SERPL-CCNC: 107 U/L — SIGNIFICANT CHANGE UP (ref 40–120)
ALT FLD-CCNC: 40 U/L — HIGH (ref 4–33)
ANISOCYTOSIS BLD QL: SLIGHT — SIGNIFICANT CHANGE UP
AST SERPL-CCNC: 29 U/L — SIGNIFICANT CHANGE UP (ref 4–32)
BASOPHILS # BLD AUTO: 0.07 K/UL — SIGNIFICANT CHANGE UP (ref 0–0.2)
BASOPHILS NFR BLD AUTO: 0.5 % — SIGNIFICANT CHANGE UP (ref 0–2)
BASOPHILS NFR SPEC: 0 % — SIGNIFICANT CHANGE UP (ref 0–2)
BILIRUB SERPL-MCNC: 0.6 MG/DL — SIGNIFICANT CHANGE UP (ref 0.2–1.2)
BLASTS # FLD: 0 % — SIGNIFICANT CHANGE UP (ref 0–0)
BUN SERPL-MCNC: 15 MG/DL — SIGNIFICANT CHANGE UP (ref 7–23)
CALCIUM SERPL-MCNC: 9.3 MG/DL — SIGNIFICANT CHANGE UP (ref 8.4–10.5)
CHLORIDE SERPL-SCNC: 101 MMOL/L — SIGNIFICANT CHANGE UP (ref 98–107)
CO2 SERPL-SCNC: 17 MMOL/L — LOW (ref 22–31)
CREAT SERPL-MCNC: 0.75 MG/DL — SIGNIFICANT CHANGE UP (ref 0.5–1.3)
EOSINOPHIL # BLD AUTO: 0.14 K/UL — SIGNIFICANT CHANGE UP (ref 0–0.5)
EOSINOPHIL NFR BLD AUTO: 0.9 % — SIGNIFICANT CHANGE UP (ref 0–6)
EOSINOPHIL NFR FLD: 0.9 % — SIGNIFICANT CHANGE UP (ref 0–6)
GIANT PLATELETS BLD QL SMEAR: PRESENT — SIGNIFICANT CHANGE UP
GLUCOSE SERPL-MCNC: 153 MG/DL — HIGH (ref 70–99)
HAV IGM SER-ACNC: NONREACTIVE — SIGNIFICANT CHANGE UP
HBV CORE IGM SER-ACNC: NONREACTIVE — SIGNIFICANT CHANGE UP
HBV SURFACE AG SER-ACNC: NONREACTIVE — SIGNIFICANT CHANGE UP
HCT VFR BLD CALC: 43 % — SIGNIFICANT CHANGE UP (ref 34.5–45)
HCV AB S/CO SERPL IA: 0.08 S/CO — SIGNIFICANT CHANGE UP
HCV AB SERPL-IMP: SIGNIFICANT CHANGE UP
HGB BLD-MCNC: 15.1 G/DL — SIGNIFICANT CHANGE UP (ref 11.5–15.5)
IMM GRANULOCYTES # BLD AUTO: 0.06 # — SIGNIFICANT CHANGE UP
IMM GRANULOCYTES NFR BLD AUTO: 0.4 % — SIGNIFICANT CHANGE UP (ref 0–1.5)
LYMPHOCYTES # BLD AUTO: 26.7 % — SIGNIFICANT CHANGE UP (ref 13–44)
LYMPHOCYTES # BLD AUTO: 4.03 K/UL — HIGH (ref 1–3.3)
LYMPHOCYTES NFR SPEC AUTO: 16.5 % — SIGNIFICANT CHANGE UP (ref 13–44)
MACROCYTES BLD QL: SLIGHT — SIGNIFICANT CHANGE UP
MCHC RBC-ENTMCNC: 33.3 PG — SIGNIFICANT CHANGE UP (ref 27–34)
MCHC RBC-ENTMCNC: 35.1 % — SIGNIFICANT CHANGE UP (ref 32–36)
MCV RBC AUTO: 94.9 FL — SIGNIFICANT CHANGE UP (ref 80–100)
METAMYELOCYTES # FLD: 0 % — SIGNIFICANT CHANGE UP (ref 0–1)
MONOCYTES # BLD AUTO: 1.56 K/UL — HIGH (ref 0–0.9)
MONOCYTES NFR BLD AUTO: 10.3 % — SIGNIFICANT CHANGE UP (ref 2–14)
MONOCYTES NFR BLD: 6.1 % — SIGNIFICANT CHANGE UP (ref 2–9)
MYELOCYTES NFR BLD: 0 % — SIGNIFICANT CHANGE UP (ref 0–0)
NEUTROPHIL AB SER-ACNC: 73 % — SIGNIFICANT CHANGE UP (ref 43–77)
NEUTROPHILS # BLD AUTO: 9.26 K/UL — HIGH (ref 1.8–7.4)
NEUTROPHILS NFR BLD AUTO: 61.2 % — SIGNIFICANT CHANGE UP (ref 43–77)
NEUTS BAND # BLD: 0 % — SIGNIFICANT CHANGE UP (ref 0–6)
NRBC # FLD: 0 — SIGNIFICANT CHANGE UP
OTHER - HEMATOLOGY %: 0 — SIGNIFICANT CHANGE UP
OVALOCYTES BLD QL SMEAR: SLIGHT — SIGNIFICANT CHANGE UP
PLATELET # BLD AUTO: 239 K/UL — SIGNIFICANT CHANGE UP (ref 150–400)
PLATELET COUNT - ESTIMATE: NORMAL — SIGNIFICANT CHANGE UP
PMV BLD: 11.1 FL — SIGNIFICANT CHANGE UP (ref 7–13)
POTASSIUM SERPL-MCNC: 3.2 MMOL/L — LOW (ref 3.5–5.3)
POTASSIUM SERPL-SCNC: 3.2 MMOL/L — LOW (ref 3.5–5.3)
PROMYELOCYTES # FLD: 0 % — SIGNIFICANT CHANGE UP (ref 0–0)
PROT SERPL-MCNC: 7.3 G/DL — SIGNIFICANT CHANGE UP (ref 6–8.3)
RBC # BLD: 4.53 M/UL — SIGNIFICANT CHANGE UP (ref 3.8–5.2)
RBC # FLD: 11.5 % — SIGNIFICANT CHANGE UP (ref 10.3–14.5)
SODIUM SERPL-SCNC: 134 MMOL/L — LOW (ref 135–145)
VARIANT LYMPHS # BLD: 3.5 % — SIGNIFICANT CHANGE UP
WBC # BLD: 15.12 K/UL — HIGH (ref 3.8–10.5)
WBC # FLD AUTO: 15.12 K/UL — HIGH (ref 3.8–10.5)

## 2018-11-02 PROCEDURE — 99232 SBSQ HOSP IP/OBS MODERATE 35: CPT

## 2018-11-02 PROCEDURE — 99222 1ST HOSP IP/OBS MODERATE 55: CPT | Mod: GC

## 2018-11-02 RX ORDER — ONDANSETRON 8 MG/1
4 TABLET, FILM COATED ORAL ONCE
Qty: 0 | Refills: 0 | Status: COMPLETED | OUTPATIENT
Start: 2018-11-02 | End: 2018-11-02

## 2018-11-02 RX ORDER — METOCLOPRAMIDE HCL 10 MG
10 TABLET ORAL ONCE
Qty: 0 | Refills: 0 | Status: COMPLETED | OUTPATIENT
Start: 2018-11-02 | End: 2018-11-02

## 2018-11-02 RX ORDER — POTASSIUM CHLORIDE 20 MEQ
20 PACKET (EA) ORAL ONCE
Qty: 0 | Refills: 0 | Status: DISCONTINUED | OUTPATIENT
Start: 2018-11-02 | End: 2018-11-02

## 2018-11-02 RX ORDER — POTASSIUM CHLORIDE 20 MEQ
20 PACKET (EA) ORAL ONCE
Qty: 0 | Refills: 0 | Status: COMPLETED | OUTPATIENT
Start: 2018-11-02 | End: 2018-11-02

## 2018-11-02 RX ADMIN — HEPARIN SODIUM 5000 UNIT(S): 5000 INJECTION INTRAVENOUS; SUBCUTANEOUS at 13:39

## 2018-11-02 RX ADMIN — HEPARIN SODIUM 5000 UNIT(S): 5000 INJECTION INTRAVENOUS; SUBCUTANEOUS at 06:21

## 2018-11-02 RX ADMIN — Medication 650 MILLIGRAM(S): at 03:11

## 2018-11-02 RX ADMIN — SENNA PLUS 2 TABLET(S): 8.6 TABLET ORAL at 22:15

## 2018-11-02 RX ADMIN — ONDANSETRON 4 MILLIGRAM(S): 8 TABLET, FILM COATED ORAL at 06:25

## 2018-11-02 RX ADMIN — ONDANSETRON 4 MILLIGRAM(S): 8 TABLET, FILM COATED ORAL at 18:13

## 2018-11-02 RX ADMIN — CEFTRIAXONE 100 GRAM(S): 500 INJECTION, POWDER, FOR SOLUTION INTRAMUSCULAR; INTRAVENOUS at 13:38

## 2018-11-02 RX ADMIN — ATORVASTATIN CALCIUM 80 MILLIGRAM(S): 80 TABLET, FILM COATED ORAL at 22:14

## 2018-11-02 RX ADMIN — Medication 10 MILLIGRAM(S): at 14:38

## 2018-11-02 RX ADMIN — Medication 650 MILLIGRAM(S): at 04:11

## 2018-11-02 RX ADMIN — LACOSAMIDE 100 MILLIGRAM(S): 50 TABLET ORAL at 10:26

## 2018-11-02 RX ADMIN — ONDANSETRON 4 MILLIGRAM(S): 8 TABLET, FILM COATED ORAL at 11:03

## 2018-11-02 RX ADMIN — Medication 100 MILLIGRAM(S): at 22:15

## 2018-11-02 RX ADMIN — HEPARIN SODIUM 5000 UNIT(S): 5000 INJECTION INTRAVENOUS; SUBCUTANEOUS at 22:15

## 2018-11-02 RX ADMIN — Medication 20 MILLIEQUIVALENT(S): at 16:20

## 2018-11-02 RX ADMIN — LACOSAMIDE 100 MILLIGRAM(S): 50 TABLET ORAL at 22:14

## 2018-11-02 NOTE — CONSULT NOTE ADULT - ASSESSMENT
63F with h/o HTN, hemorrhagic cva s/p craniotomy presented with right sided hemiplegia, aphasia. CTA with extensive left carotid artery dz. Admit for PT evaluation and rehab placement.   Now being worked up for possible intraabdominal infection.  Was started on Ceftriaxone on 10/30.  Liver US just shows hepatic steatosis.   Abd xray nonrevealing.  Awaiting CT A/P    Suggest: 63F with h/o HTN, hemorrhagic cva s/p craniotomy presented with right sided hemiplegia, aphasia. CTA with extensive left carotid artery dz. Admit for PT evaluation and rehab placement.   Now being worked up for possible intraabdominal infection.  Was started on Ceftriaxone on 10/30.  Liver US just shows hepatic steatosis.   Abd xray nonrevealing.  Awaiting CT A/P    Suggest:  send blood cultures  stop ceftriaxone   obtain CT A/P  Hepatology consult   check Hepatitis panel

## 2018-11-02 NOTE — OCCUPATIONAL THERAPY INITIAL EVALUATION ADULT - IMPAIRED TRANSFERS: SIT/STAND, REHAB EVAL
decreased strength/abnormal muscle tone/impaired postural control/impaired balance/impaired motor control

## 2018-11-02 NOTE — CONSULT NOTE ADULT - SUBJECTIVE AND OBJECTIVE BOX
Chief Complaint:  Patient is a 63y old  Female who presents with a chief complaint of unable to ambulate (02 Nov 2018 16:19)      HPI:    Allergies:  No Known Allergies      Home Medications:    Hospital Medications:  acetaminophen   Tablet .. 650 milliGRAM(s) Oral every 6 hours PRN  aspirin enteric coated 81 milliGRAM(s) Oral daily  atorvastatin 80 milliGRAM(s) Oral at bedtime  dextrose 5% + sodium chloride 0.9% with potassium chloride 20 mEq/L 1000 milliLiter(s) IV Continuous <Continuous>  docusate sodium 100 milliGRAM(s) Oral three times a day  heparin  Injectable 5000 Unit(s) SubCutaneous every 8 hours  influenza   Vaccine 0.5 milliLiter(s) IntraMuscular once  lacosamide 100 milliGRAM(s) Oral two times a day  lactulose Syrup 10 Gram(s) Oral every 6 hours  NIFEdipine XL 90 milliGRAM(s) Oral daily  ondansetron Injectable 4 milliGRAM(s) IV Push every 8 hours PRN  polyethylene glycol 3350 17 Gram(s) Oral daily  senna 2 Tablet(s) Oral at bedtime  spironolactone 25 milliGRAM(s) Oral daily      PMHX/PSHX:  Cerebral aneurysm rupture  Aneurysm  Hemorrhagic stroke  Seizure  Hypertension, unspecified type  H/O craniotomy  S/P cholecystectomy      Family history:  No pertinent family history in first degree relatives      Social History:     ROS:     General:  No wt loss, fevers, chills, night sweats, fatigue,   Eyes:  Good vision, no reported pain  ENT:  No sore throat, pain, runny nose, dysphagia  CV:  No pain, palpitations, hypo/hypertension  Resp:  No dyspnea, cough, tachypnea, wheezing  GI:  No pain, No nausea, No vomiting, No diarrhea, No constipation, No weight loss, No fever, No pruritis, No rectal bleeding, No tarry stools, No dysphagia,  :  No pain, bleeding, incontinence, nocturia  Muscle:  No pain, weakness  Neuro:  No weakness, tingling, memory problems  Psych:  No fatigue, insomnia, mood problems, depression  Endocrine:  No polyuria, polydipsia, cold/heat intolerance  Heme:  No petechiae, ecchymosis, easy bruisability  Skin:  No rash, tattoos, scars, edema      PHYSICAL EXAM:     GENERAL:  No acute distress  HEENT:  Normocephalic/atraumatic, no scleral icterus  CHEST:  Clear to auscultation bilaterally, no wheezes/rales/ronchi, no accessory muscle use  HEART:  Regular rate and rhythm, no murmurs/rubs/gallops  ABDOMEN:  Soft, non-tender, non-distended, normoactive bowel sounds,  no masses, no hepato-splenomegaly, no signs of chronic liver disease  EXTREMITIES: No cyanosis, clubbing, or edema  SKIN:  No rash/erythema/ecchymoses/petechiae/wounds/abscess/warm/dry  NEURO:  Alert and oriented x 3, no asterixis    Vital Signs:  Vital Signs Last 24 Hrs  T(C): 36.4 (02 Nov 2018 20:42), Max: 36.7 (02 Nov 2018 13:20)  T(F): 97.6 (02 Nov 2018 20:42), Max: 98 (02 Nov 2018 13:20)  HR: 83 (02 Nov 2018 20:42) (65 - 83)  BP: 132/82 (02 Nov 2018 20:42) (132/82 - 144/87)  BP(mean): --  RR: 18 (02 Nov 2018 20:42) (18 - 18)  SpO2: 100% (02 Nov 2018 20:42) (97% - 100%)  Daily     Daily     LABS:                        15.1   15.12 )-----------( 239      ( 02 Nov 2018 06:30 )             43.0     Mean Cell Volume: 94.9 fL (11-02-18 @ 06:30)    11-02    134<L>  |  101  |  15  ----------------------------<  153<H>  3.2<L>   |  17<L>  |  0.75    Ca    9.3      02 Nov 2018 06:30  Phos  3.4     11-01  Mg     2.0     11-01    TPro  7.3  /  Alb  3.9  /  TBili  0.6  /  DBili  x   /  AST  29  /  ALT  40<H>  /  AlkPhos  107  11-02    LIVER FUNCTIONS - ( 02 Nov 2018 06:30 )  Alb: 3.9 g/dL / Pro: 7.3 g/dL / ALK PHOS: 107 u/L / ALT: 40 u/L / AST: 29 u/L / GGT: x                                       15.1   15.12 )-----------( 239      ( 02 Nov 2018 06:30 )             43.0                         16.4   14.13 )-----------( 279      ( 01 Nov 2018 05:45 )             46.4       Imaging: Chief Complaint: Ambulatory dysfunction    HPI:    62 y/o German speaking F w/ hx of HTN, HLD, hemorrhagic CVA s/p craniotomy in 2006 with residual right sided weakness and minimally verbal, who [place] [facility] [place] on 10[place]23[place]18, and admitted to [facility] on the same day for ambulatory dysfunction. Per the patient's daughter who was at the bedside, the patient developed decreased appetite, nausea/vomiting, and abdominal tenderness during the course of her hospitalization. She states that the patient was able to eat food she brought from home without any difficulty, however, vomited shortly after eating salmon from the hospital dining service a few days prior. There is no report of bloody emesis, coffee ground emesis, bloody emesis, black tarry stools, diarrhea, constipation, difficulty swallowing, or painful swallowing.     The patient underwent colonoscopy a 3 to 4 years ago, which was reportedly normal. She has never had an upper endoscopy.    Allergies:  No Known Allergies    Hospital Medications:  acetaminophen   Tablet .. 650 milliGRAM(s) Oral every 6 hours PRN  aspirin enteric coated 81 milliGRAM(s) Oral daily  atorvastatin 80 milliGRAM(s) Oral at bedtime  dextrose 5% + sodium chloride 0.9% with potassium chloride 20 mEq/L 1000 milliLiter(s) IV Continuous <Continuous>  docusate sodium 100 milliGRAM(s) Oral three times a day  heparin  Injectable 5000 Unit(s) SubCutaneous every 8 hours  influenza   Vaccine 0.5 milliLiter(s) IntraMuscular once  lacosamide 100 milliGRAM(s) Oral two times a day  lactulose Syrup 10 Gram(s) Oral every 6 hours  NIFEdipine XL 90 milliGRAM(s) Oral daily  ondansetron Injectable 4 milliGRAM(s) IV Push every 8 hours PRN  polyethylene glycol 3350 17 Gram(s) Oral daily  senna 2 Tablet(s) Oral at bedtime  spironolactone 25 milliGRAM(s) Oral daily    PMHX/PSHX:  Cerebral aneurysm rupture  Aneurysm  Hemorrhagic stroke  Seizure  Hypertension, unspecified type  H/O craniotomy  S/P cholecystectomy    Family history:    Denies family history of colon cancer/polyps, stomach cancer/polyps, pancreatic cancer/masses, and liver cancer/disease.    Social History:   Tob: Denies  EtOH: Denies  Illicit Drugs:    ROS: Unable to perform as patient is minimally verbal    PHYSICAL EXAM:     GENERAL:  No acute distress  HEENT:  Normocephalic/atraumatic, no scleral icterus  CHEST:  Clear to auscultation bilaterally, no wheezes/rales/ronchi, no accessory muscle use  HEART:  Regular rate and rhythm, no murmurs/rubs/gallops  ABDOMEN:  Soft, upper abdominal tenderness, non-distended, normoactive bowel sounds  EXTREMITIES: No cyanosis, clubbing, or edema  SKIN:  No rash/erythema  NEURO: Patient minimally verbal    Vital Signs:  Vital Signs Last 24 Hrs  T(C): 36.4 (02 Nov 2018 20:42), Max: 36.7 (02 Nov 2018 13:20)  T(F): 97.6 (02 Nov 2018 20:42), Max: 98 (02 Nov 2018 13:20)  HR: 83 (02 Nov 2018 20:42) (65 - 83)  BP: 132/82 (02 Nov 2018 20:42) (132/82 - 144/87)  BP(mean): --  RR: 18 (02 Nov 2018 20:42) (18 - 18)  SpO2: 100% (02 Nov 2018 20:42) (97% - 100%)    LABS:                        15.1   15.12 )-----------( 239      ( 02 Nov 2018 06:30 )             43.0     Mean Cell Volume: 94.9 fL (11-02-18 @ 06:30)    11-02    134<L>  |  101  |  15  ----------------------------<  153<H>  3.2<L>   |  17<L>  |  0.75    Ca    9.3      02 Nov 2018 06:30  Phos  3.4     11-01  Mg     2.0     11-01    TPro  7.3  /  Alb  3.9  /  TBili  0.6  /  DBili  x   /  AST  29  /  ALT  40<H>  /  AlkPhos  107  11-02    LIVER FUNCTIONS - ( 02 Nov 2018 06:30 )  Alb: 3.9 g/dL / Pro: 7.3 g/dL / ALK PHOS: 107 u/L / ALT: 40 u/L / AST: 29 u/L / GGT: x                      15.1   15.12 )-----------( 239      ( 02 Nov 2018 06:30 )             43.0                         16.4   14.13 )-----------( 279      ( 01 Nov 2018 05:45 )             46.4       Imaging:    < from: US Abdomen Limited (10.30.18 @ 10:48) >  FINDINGS:    Limited exam.    Liver: Increased echogenicity, may be seen in steatosis.    Bile ducts: Normal caliber. Common hepatic duct measures 2 mm.     Gallbladder: Cholecystectomy.    Pancreas: Visualized portions are within normal limits.    Right kidney: 8.1 cm. A 1.4 cm right upper pole renal cyst. No   hydronephrosis.    Ascites: None.    IVC: Visualized portions are within normal limits.    IMPRESSION:     Hepatic steatosis.    < end of copied text > Chief Complaint: Ambulatory dysfunction    HPI:    62 y/o Wol[place] speaking F w/ hx of HTN, HLD, hemorrhagic CVA s/p craniotomy in 2006 with residual right sided weakness and minimally verbal, who [place] from the [facility] [place] on 10/23/18, and admitted to [facility] on the same day for ambulatory dysfunction. Per the patient's daughter who was at the bedside, the patient developed decreased appetite, nausea/vomiting, and abdominal tenderness during the course of her hospitalization. She states that the patient was able to eat food she brought from home without any difficulty, however, vomited shortly after eating salmon from the hospital dining service a few days prior. There is no report of bloody emesis, coffee ground emesis, bloody emesis, black tarry stools, diarrhea, constipation, difficulty swallowing, or painful swallowing. Patient is unable to provide history given aphasia.    The patient underwent colonoscopy a 3 to 4 years ago, which was reportedly normal. She has never had an upper endoscopy.    Allergies:  No Known Allergies    Hospital Medications:  acetaminophen   Tablet .. 650 milliGRAM(s) Oral every 6 hours PRN  aspirin enteric coated 81 milliGRAM(s) Oral daily  atorvastatin 80 milliGRAM(s) Oral at bedtime  dextrose 5% + sodium chloride 0.9% with potassium chloride 20 mEq/L 1000 milliLiter(s) IV Continuous <Continuous>  docusate sodium 100 milliGRAM(s) Oral three times a day  heparin  Injectable 5000 Unit(s) SubCutaneous every 8 hours  influenza   Vaccine 0.5 milliLiter(s) IntraMuscular once  lacosamide 100 milliGRAM(s) Oral two times a day  lactulose Syrup 10 Gram(s) Oral every 6 hours  NIFEdipine XL 90 milliGRAM(s) Oral daily  ondansetron Injectable 4 milliGRAM(s) IV Push every 8 hours PRN  polyethylene glycol 3350 17 Gram(s) Oral daily  senna 2 Tablet(s) Oral at bedtime  spironolactone 25 milliGRAM(s) Oral daily    PMHX/PSHX:  Cerebral aneurysm rupture  Aneurysm  Hemorrhagic stroke  Seizure  Hypertension, unspecified type  H/O craniotomy  S/P cholecystectomy    Family history:    Denies family history of colon cancer/polyps, stomach cancer/polyps, pancreatic cancer/masses, and liver cancer/disease.    Social History:   Tob: Denies  EtOH: Denies  Illicit Drugs:    ROS: Unable to perform as patient is minimally verbal    PHYSICAL EXAM:     GENERAL:  No acute distress  HEENT:  Normocephalic/atraumatic, no scleral icterus  CHEST:  Clear to auscultation bilaterally, no wheezes/rales/ronchi, no accessory muscle use  HEART:  Regular rate and rhythm, no murmurs/rubs/gallops  ABDOMEN:  Soft, upper abdominal tenderness, non-distended, normoactive bowel sounds  EXTREMITIES: No cyanosis, clubbing, or edema  SKIN:  No rash/erythema  NEURO: Patient minimally verbal with expressive aphasia    Vital Signs:  Vital Signs Last 24 Hrs  T(C): 36.4 (02 Nov 2018 20:42), Max: 36.7 (02 Nov 2018 13:20)  T(F): 97.6 (02 Nov 2018 20:42), Max: 98 (02 Nov 2018 13:20)  HR: 83 (02 Nov 2018 20:42) (65 - 83)  BP: 132/82 (02 Nov 2018 20:42) (132/82 - 144/87)  BP(mean): --  RR: 18 (02 Nov 2018 20:42) (18 - 18)  SpO2: 100% (02 Nov 2018 20:42) (97% - 100%)    LABS:                        15.1   15.12 )-----------( 239      ( 02 Nov 2018 06:30 )             43.0     Mean Cell Volume: 94.9 fL (11-02-18 @ 06:30)    11-02    134<L>  |  101  |  15  ----------------------------<  153<H>  3.2<L>   |  17<L>  |  0.75    Ca    9.3      02 Nov 2018 06:30  Phos  3.4     11-01  Mg     2.0     11-01    TPro  7.3  /  Alb  3.9  /  TBili  0.6  /  DBili  x   /  AST  29  /  ALT  40<H>  /  AlkPhos  107  11-02    LIVER FUNCTIONS - ( 02 Nov 2018 06:30 )  Alb: 3.9 g/dL / Pro: 7.3 g/dL / ALK PHOS: 107 u/L / ALT: 40 u/L / AST: 29 u/L / GGT: x                      15.1   15.12 )-----------( 239      ( 02 Nov 2018 06:30 )             43.0                         16.4   14.13 )-----------( 279      ( 01 Nov 2018 05:45 )             46.4       Imaging:    < from: US Abdomen Limited (10.30.18 @ 10:48) >  FINDINGS:    Limited exam.    Liver: Increased echogenicity, may be seen in steatosis.    Bile ducts: Normal caliber. Common hepatic duct measures 2 mm.     Gallbladder: Cholecystectomy.    Pancreas: Visualized portions are within normal limits.    Right kidney: 8.1 cm. A 1.4 cm right upper pole renal cyst. No   hydronephrosis.    Ascites: None.    IVC: Visualized portions are within normal limits.    IMPRESSION:     Hepatic steatosis.    < end of copied text >

## 2018-11-02 NOTE — PROGRESS NOTE ADULT - SUBJECTIVE AND OBJECTIVE BOX
Patient is a 63y old  Female who presents with a chief complaint of unable to ambulate (02 Nov 2018 10:46)      SUBJECTIVE / OVERNIGHT EVENTS:  Patient afebrile  and non- verbal    MEDICATIONS  (STANDING):  aspirin enteric coated 81 milliGRAM(s) Oral daily  atorvastatin 80 milliGRAM(s) Oral at bedtime  cefTRIAXone   IVPB      cefTRIAXone   IVPB 1 Gram(s) IV Intermittent every 24 hours  dextrose 5% + sodium chloride 0.9% with potassium chloride 20 mEq/L 1000 milliLiter(s) (70 mL/Hr) IV Continuous <Continuous>  docusate sodium 100 milliGRAM(s) Oral three times a day  heparin  Injectable 5000 Unit(s) SubCutaneous every 8 hours  influenza   Vaccine 0.5 milliLiter(s) IntraMuscular once  lacosamide 100 milliGRAM(s) Oral two times a day  lactulose Syrup 10 Gram(s) Oral every 6 hours  metoclopramide Injectable 10 milliGRAM(s) IV Push once  NIFEdipine XL 90 milliGRAM(s) Oral daily  polyethylene glycol 3350 17 Gram(s) Oral daily  potassium chloride    Tablet ER 20 milliEquivalent(s) Oral once  senna 2 Tablet(s) Oral at bedtime  spironolactone 25 milliGRAM(s) Oral daily    MEDICATIONS  (PRN):  acetaminophen   Tablet .. 650 milliGRAM(s) Oral every 6 hours PRN Temp greater or equal to 38C (100.4F), Mild Pain (1 - 3), Moderate Pain (4 - 6)  ondansetron Injectable 4 milliGRAM(s) IV Push every 8 hours PRN Nausea and/or Vomiting    I&O's Summary    02 Nov 2018 07:01  -  02 Nov 2018 14:26  --------------------------------------------------------  IN: 50 mL / OUT: 0 mL / NET: 50 mL        PHYSICAL EXAM:  Vital Signs Last 24 Hrs  T(C): 36.7 (02 Nov 2018 13:20), Max: 36.9 (01 Nov 2018 21:31)  T(F): 98 (02 Nov 2018 13:20), Max: 98.4 (01 Nov 2018 21:31)  HR: 80 (02 Nov 2018 13:20) (65 - 80)  BP: 134/81 (02 Nov 2018 13:20) (133/85 - 144/87)  BP(mean): --  RR: 18 (02 Nov 2018 13:20) (18 - 18)  SpO2: 97% (02 Nov 2018 13:20) (97% - 100%)  GENERAL: NAD, well-developed  HEAD:  Atraumatic, Normocephalic  EYES: EOMI, PERRLA, conjunctiva and sclera clear  NECK: Supple, No JVD  CHEST/LUNG: Clear to auscultation bilaterally; No wheeze  HEART: Regular rate and rhythm; No murmurs, rubs, or gallops  ABDOMEN: Soft, Nontender, Nondistended; Bowel sounds present  EXTREMITIES:  2+ Peripheral Pulses, No clubbing, cyanosis, or edema  R hand contracted  PSYCH: Alert  NEUROLOGY: R hemiperesis  SKIN: No rashes or lesions    LABS:                        15.1   15.12 )-----------( 239      ( 02 Nov 2018 06:30 )             43.0     11-02    134<L>  |  101  |  15  ----------------------------<  153<H>  3.2<L>   |  17<L>  |  0.75    Ca    9.3      02 Nov 2018 06:30  Phos  3.4     11-01  Mg     2.0     11-01    TPro  7.3  /  Alb  3.9  /  TBili  0.6  /  DBili  x   /  AST  29  /  ALT  40<H>  /  AlkPhos  107  11-02      RADIOLOGY & ADDITIONAL TESTS:    Imaging Personally Reviewed:    Consultant(s) Notes Reviewed:      Care Discussed with Consultants/Other Providers:

## 2018-11-02 NOTE — CONSULT NOTE ADULT - SUBJECTIVE AND OBJECTIVE BOX
Patient is a 63y old  Female who presents with a chief complaint of unable to ambulate (02 Nov 2018 10:46)      HPI:  63F with PMHx HTN, ?Seizure, hemorrhagic stroke due to aneurysm s/p craniotomy presented with R sided weakness. Patient was non-verbal, information gathered from Daughter Yamileth. Per daughter, pt normally lives in the [facility], was visiting family in  for the past few months. 3 months ago, while aboard, patient suffered from a stroke. Patient was found unconscious at home and when she woke up, she could not move the right side of her body. She brought her mother in for evaluation straight from the airport for evaluation, because she does not understand what happened and cannot take care of patient at home. Per Daughter, patient had a aneurysm rupture 12 years ago, with a prolonged ICU stay at the time. Patient had craniotomy with metal plate placed since. No residual deficit after the event. Patient had been following up with Dr. Winkler (neurologist in [facility]) since. Prior to this stroke, patient had been ambulatory and functional with full ADL. Per patient (via ), she had no fever, chill, chest pain, SOB, palpitation, abdominal pain or urinary complaints.    In ED, patient was afebrile and VSS. Labs were unremarkable. CT head and CTA head/neck showed old craniotomy, left sided infarct and bilateral encephalomalacias. Completely occluded left internal carotid artery.  Occluded left anterior cerebral artery. Occlusion of the superior division of the left middle cerebral artery. Neurology was consulted (23 Oct 2018 09:35)    Above reviewed. Admitted for CVA. Having abd pain and transaminitis. Started on ceftriaxone.   ID  consulted for infectious workup.       PAST MEDICAL & SURGICAL HISTORY:  Cerebral aneurysm rupture  Hemorrhagic stroke  Seizure  Hypertension, unspecified type  H/O craniotomy  S/P cholecystectomy      Allergies  No Known Allergies        ANTIMICROBIALS:  cefTRIAXone   IVPB    cefTRIAXone   IVPB 1 every 24 hours      OTHER MEDS: MEDICATIONS  (STANDING):  acetaminophen   Tablet .. 650 every 6 hours PRN  aspirin enteric coated 81 daily  atorvastatin 80 at bedtime  docusate sodium 100 three times a day  heparin  Injectable 5000 every 8 hours  influenza   Vaccine 0.5 once  lacosamide 100 two times a day  lactulose Syrup 10 every 6 hours  NIFEdipine XL 90 daily  ondansetron Injectable 4 every 8 hours PRN  polyethylene glycol 3350 17 daily  senna 2 at bedtime  spironolactone 25 daily      SOCIAL HISTORY:     No smoking  no etoh  no drug use    FAMILY HISTORY:  No pertinent family history in first degree relatives      REVIEW OF SYSTEMS  [ X] All other systems negative except as noted below:	    Constitutional:  [ ] fever [ ] chills  [ ] weight loss  [X ] weakness  Skin:  [ ] rash [ ] phlebitis	  Eyes: [ ] icterus [ ] pain  [ ] discharge	  ENMT: [ ] sore throat  [ ] thrush [ ] ulcers [ ] exudates  Respiratory: [ ] dyspnea [ ] hemoptysis [ ] cough [ ] sputum	  Cardiovascular:  [ ] chest pain [ ] palpitations [ ] edema	  Gastrointestinal:  [ ] nausea [ ] vomiting [ ] diarrhea [ ] constipation [X ] pain	  Genitourinary:  [ ] dysuria [ ] frequency [ ] hematuria [ ] discharge [ ] flank pain  [ ] incontinence  Musculoskeletal:  [ ] myalgias [ ] arthralgias [ ] arthritis  [ ] back pain  Neurological:  [ ] headache [ ] seizures  [ ] confusion/altered mental status  Psychiatric:  [ ] anxiety [ ] depression	  Hematology/Lymphatics:  [ ] lymphadenopathy  Endocrine:  [ ] adrenal [ ] thyroid  Allergic/Immunologic:	 [ ] transplant [ ] seasonal    Vital Signs Last 24 Hrs  T(F): 98 (11-02-18 @ 13:20), Max: 99.8 (10-30-18 @ 22:00)    Vital Signs Last 24 Hrs  HR: 80 (11-02-18 @ 13:20) (65 - 80)  BP: 134/81 (11-02-18 @ 13:20) (133/85 - 144/87)  RR: 18 (11-02-18 @ 13:20)  SpO2: 97% (11-02-18 @ 13:20) (97% - 100%)  Wt(kg): --    PHYSICAL EXAM:  General: non-toxic  HEAD/EYES: anicteric, PERRL  ENT:  supple  Cardiovascular:   S1, S2  Respiratory:  clear bilaterally  GI:  soft, non-tender, normal bowel sounds  :  no CVA tenderness   Musculoskeletal:  no synovitis  Neurologic:  grossly non-focal  Skin:  no rash  Lymph: no lymphadenopathy  Psychiatric:  appropriate affect  Vascular:  no phlebitis    WBC Count: 15.12 K/uL (11-02 @ 06:30)  WBC Count: 14.13 K/uL (11-01 @ 05:45)  WBC Count: 15.19 K/uL (10-30 @ 05:30)                        15.1   15.12 )-----------( 239      ( 02 Nov 2018 06:30 )             43.0       11-02    134<L>  |  101  |  15  ----------------------------<  153<H>  3.2<L>   |  17<L>  |  0.75    Ca    9.3      02 Nov 2018 06:30  Phos  3.4     11-01  Mg     2.0     11-01    TPro  7.3  /  Alb  3.9  /  TBili  0.6  /  DBili  x   /  AST  29  /  ALT  40<H>  /  AlkPhos  107  11-02          MICROBIOLOGY:  MEDICATIONS  (STANDING):    cefTRIAXone   IVPB   100 mL/Hr IV Intermittent (10-30-18 @ 14:19)    cefTRIAXone   IVPB   100 mL/Hr IV Intermittent (11-02-18 @ 13:38)   100 mL/Hr IV Intermittent (11-01-18 @ 14:12)   100 mL/Hr IV Intermittent (10-31-18 @ 13:08)          RADIOLOGY:  < from: Xray Abdomen 1 View PORTABLE -Routine (11.01.18 @ 08:15) >  IMPRESSION: Nonobstructive bowel gas pattern. Oral contrast opacifies   descending and sigmoid colon and the rectum, similar to  radiograph   from incomplete CT abdomen and pelvis 10/31/2018.    < end of copied text >    < from: US Abdomen Limited (10.30.18 @ 10:48) >  IMPRESSION:     Hepatic steatosis.    < end of copied text >    < from: CT Angio Head w/ IV Cont (10.23.18 @ 02:35) >  IMPRESSION:     CT brain: Post right pterional craniotomy with aneurysm clip in the right   parasellar region. Encephalomalacia in the right inferior frontal and   temporal lobes. Wide region of encephalomalacia in the left frontal lobe   and left basal ganglia likely reflective of an old infarct.  Small chronic appearing left parietal cortical infarct. No gross acute   intracranial hemorrhage or significant mass effect.    CT angiography neck: Completely occluded left internal carotid artery. No   significant stenosis of the cervical right internal carotid artery based   on NASCET criteria. Patent cervical vertebral arteries.    CT angiography brain: Reconstitution of the left internal carotid artery   at the cavernous and supraclinoid segments with trickle flow. Occluded   left anterior cerebral artery. Patent left middle cerebral artery M1 and   M2 inferior division. Occlusion of the superior division of the left   middle cerebral artery. Several poorly evaluated vessels secondary to   streak artifact from the aneurysm clip. MRA may be considered for further   evaluation.    < end of copied text > Patient is a 63y old  Female who presents with a chief complaint of unable to ambulate (02 Nov 2018 10:46)      HPI:  63F with PMHx HTN, ?Seizure, hemorrhagic stroke due to aneurysm s/p craniotomy presented with R sided weakness. Patient was non-verbal, information gathered from Daughter Yamileth. Per daughter, pt [facility] the [facility], was visiting family in  for the past few months. 3 months ago, while aboard, patient suffered from a stroke. Patient was found unconscious at home and when she woke up, she could not move the right side of her body. She brought her mother in for evaluation straight from the airport for evaluation, because she does not understand what happened and cannot take care of patient at home. Per Daughter, patient had a aneurysm rupture 12 years ago, with a prolonged ICU stay at the time. Patient had craniotomy with metal plate placed since. No residual deficit after the event. Patient had been following up with Dr. Winkler (neurologist in [facility]) since. Prior to this stroke, patient had been ambulatory and functional with full ADL. Per patient (via ), she had no fever, chill, chest pain, SOB, palpitation, abdominal pain or urinary complaints.    In ED, patient was afebrile and VSS. Labs were unremarkable. CT head and CTA head/neck showed old craniotomy, left sided infarct and bilateral encephalomalacias. Completely occluded left internal carotid artery.  Occluded left anterior cerebral artery. Occlusion of the superior division of the left middle cerebral artery. Neurology was consulted (23 Oct 2018 09:35)    Above reviewed. Admitted for CVA. Having abd pain and transaminitis. Started on ceftriaxone.   ID  consulted for infectious workup.       PAST MEDICAL & SURGICAL HISTORY:  Cerebral aneurysm rupture  Hemorrhagic stroke  Seizure  Hypertension, unspecified type  H/O craniotomy  S/P cholecystectomy      Allergies  No Known Allergies        ANTIMICROBIALS:  cefTRIAXone   IVPB    cefTRIAXone   IVPB 1 every 24 hours      OTHER MEDS: MEDICATIONS  (STANDING):  acetaminophen   Tablet .. 650 every 6 hours PRN  aspirin enteric coated 81 daily  atorvastatin 80 at bedtime  docusate sodium 100 three times a day  heparin  Injectable 5000 every 8 hours  influenza   Vaccine 0.5 once  lacosamide 100 two times a day  lactulose Syrup 10 every 6 hours  NIFEdipine XL 90 daily  ondansetron Injectable 4 every 8 hours PRN  polyethylene glycol 3350 17 daily  senna 2 at bedtime  spironolactone 25 daily      SOCIAL HISTORY:     No smoking  no etoh  no drug use    FAMILY HISTORY:  No pertinent family history in first degree relatives      REVIEW OF SYSTEMS  [ X] All other systems negative except as noted below:	    Constitutional:  [ ] fever [ ] chills  [ ] weight loss  [X ] weakness  Skin:  [ ] rash [ ] phlebitis	  Eyes: [ ] icterus [ ] pain  [ ] discharge	  ENMT: [ ] sore throat  [ ] thrush [ ] ulcers [ ] exudates  Respiratory: [ ] dyspnea [ ] hemoptysis [ ] cough [ ] sputum	  Cardiovascular:  [ ] chest pain [ ] palpitations [ ] edema	  Gastrointestinal:  [ ] nausea [ ] vomiting [ ] diarrhea [ ] constipation [X ] pain	  Genitourinary:  [ ] dysuria [ ] frequency [ ] hematuria [ ] discharge [ ] flank pain  [ ] incontinence  Musculoskeletal:  [ ] myalgias [ ] arthralgias [ ] arthritis  [ ] back pain  Neurological:  [ ] headache [ ] seizures  [ ] confusion/altered mental status  Psychiatric:  [ ] anxiety [ ] depression	  Hematology/Lymphatics:  [ ] lymphadenopathy  Endocrine:  [ ] adrenal [ ] thyroid  Allergic/Immunologic:	 [ ] transplant [ ] seasonal    Vital Signs Last 24 Hrs  T(F): 98 (11-02-18 @ 13:20), Max: 99.8 (10-30-18 @ 22:00)    Vital Signs Last 24 Hrs  HR: 80 (11-02-18 @ 13:20) (65 - 80)  BP: 134/81 (11-02-18 @ 13:20) (133/85 - 144/87)  RR: 18 (11-02-18 @ 13:20)  SpO2: 97% (11-02-18 @ 13:20) (97% - 100%)  Wt(kg): --    PHYSICAL EXAM:  General: non-toxic  HEAD/EYES: anicteric, PERRL  ENT:  supple  Cardiovascular:   S1, S2  Respiratory:  clear bilaterally  GI:  soft, very slight tenderness without rebound or guarding, normal bowel sounds  :  no CVA tenderness   Musculoskeletal:  no synovitis  Neurologic:  0/5 strength in RUE and RLE, weakness is 4/5 in LUE  Skin:  no rash  Lymph: no lymphadenopathy  Psychiatric:  appropriate affect  Vascular:  no phlebitis    WBC Count: 15.12 K/uL (11-02 @ 06:30)  WBC Count: 14.13 K/uL (11-01 @ 05:45)  WBC Count: 15.19 K/uL (10-30 @ 05:30)                        15.1   15.12 )-----------( 239      ( 02 Nov 2018 06:30 )             43.0       11-02    134<L>  |  101  |  15  ----------------------------<  153<H>  3.2<L>   |  17<L>  |  0.75    Ca    9.3      02 Nov 2018 06:30  Phos  3.4     11-01  Mg     2.0     11-01    TPro  7.3  /  Alb  3.9  /  TBili  0.6  /  DBili  x   /  AST  29  /  ALT  40<H>  /  AlkPhos  107  11-02          MICROBIOLOGY:  MEDICATIONS  (STANDING):    cefTRIAXone   IVPB   100 mL/Hr IV Intermittent (10-30-18 @ 14:19)    cefTRIAXone   IVPB   100 mL/Hr IV Intermittent (11-02-18 @ 13:38)   100 mL/Hr IV Intermittent (11-01-18 @ 14:12)   100 mL/Hr IV Intermittent (10-31-18 @ 13:08)          RADIOLOGY:  < from: Xray Abdomen 1 View PORTABLE -Routine (11.01.18 @ 08:15) >  IMPRESSION: Nonobstructive bowel gas pattern. Oral contrast opacifies   descending and sigmoid colon and the rectum, similar to  radiograph   from incomplete CT abdomen and pelvis 10/31/2018.    < end of copied text >    < from: US Abdomen Limited (10.30.18 @ 10:48) >  IMPRESSION:     Hepatic steatosis.    < end of copied text >    < from: CT Angio Head w/ IV Cont (10.23.18 @ 02:35) >  IMPRESSION:     CT brain: Post right pterional craniotomy with aneurysm clip in the right   parasellar region. Encephalomalacia in the right inferior frontal and   temporal lobes. Wide region of encephalomalacia in the left frontal lobe   and left basal ganglia likely reflective of an old infarct.  Small chronic appearing left parietal cortical infarct. No gross acute   intracranial hemorrhage or significant mass effect.    CT angiography neck: Completely occluded left internal carotid artery. No   significant stenosis of the cervical right internal carotid artery based   on NASCET criteria. Patent cervical vertebral arteries.    CT angiography brain: Reconstitution of the left internal carotid artery   at the cavernous and supraclinoid segments with trickle flow. Occluded   left anterior cerebral artery. Patent left middle cerebral artery M1 and   M2 inferior division. Occlusion of the superior division of the left   middle cerebral artery. Several poorly evaluated vessels secondary to   streak artifact from the aneurysm clip. MRA may be considered for further   evaluation.    < end of copied text >

## 2018-11-02 NOTE — PROGRESS NOTE ADULT - PROBLEM SELECTOR PLAN 1
subacute stroke with extensive left carotid dz.   clip was placed 12 years ago in a hospital in [place], unclear if compatible with MRI but will not  at this time. CTA H/N done instead. No vascular intervention  c/w ASA 81mg daily, statin, dysphagia diet  TTE unremarkable, EF: 63%  Awaiting rehab

## 2018-11-02 NOTE — CONSULT NOTE ADULT - ATTENDING COMMENTS
I have seen and examined this patient with the stroke neurology team.     History was reviewed from the chart.   ROS: All negative except documented in the HPI and as per chart review.   Neurological exam was performed and agree with exam as documented above.   Laboratory results and imaging studies were reviewed by me.   I agree with the neurology resident note as documented above.    63 years old woman with history of cerebral aneurysm s/p clipping of multiple strokes in the past and we sent "stroke" about 3 months prior to presentation is evaluated at [facility] for evaluation of her recent "stroke". Neurological examination today shows a right hemiplegia and expressive aphasia. CT brain showed encephalomalacia in bilateral orbitofrontal lobes and clip artifact in the right ICA region. Exact etiology of bilateral orbitofrontal encephalomalacia remains unclear and requires further evaluation. History of recent "stroke" is also unclear and requires further evaluation.    Plan:  - Continue aspirin if it was a home medication  - Agree to continue with pharmacological DVT prophylaxis   - Statin therapy to be considered based on medical indications/ASCVD risk profile  - HbA1C and LDL, continue with aggressive vascular risk factors modifications   - BP goal - gradual normotension   - Could consider performing MRI brain without contrast; if clip is deemed to be compatible with MR  - CTA head and neck to evaluate for intracranial and extracranial cerebral vasculature  - TTE with bubble study and continue with telemetry to screen for possible cardiac source of embolism  - Would try to get in touch with family members to obtain more history   - PT/OT/Speech and swallow/safety eval  - Would continue to follow
63 year old with prior CVA presented with subacute CVA.    Course complicated by complaint of abdominal pain with elevated transaminases.  Associated leukocytosis.    Consider CT a/p to evaluate abdominal pain.    Check blood cultures  Check hepatitis panel    unclear focus of leukocytosis and abd pain.    Observe off abx.  Ritchie nunez.
Full consult H&P as above; discussed with residents. The patient was seen and examined.  She is a 63-year-old lady who previously had a hemorrhagic stroke due to a ruptured cerebral aneurysm.  This was treated with her craniotomy and aneurysm clipping.  She has a significant right-sided hemiplegia for several months.    A CT angiogram and duplex ultrasound noted complete occlusion of the cervical segment of the left internal carotid artery.  The right carotid artery was widely patent.  The chronically occluded left internal carotid artery cannot be managed with any surgery.  There is no indication for for either carotid surgery or a carotid stent.
Patient seen and examined. Agree with above. The patient is expressive aphasia and thus it is very hard to know what symptoms patient is having. No emergent findings on abdominal sonogram. Would check liver workup given mildly elevated ALT, and also obtain abdominal x-ray to rule out constipation/ileus.

## 2018-11-02 NOTE — OCCUPATIONAL THERAPY INITIAL EVALUATION ADULT - PLANNED THERAPY INTERVENTIONS, OT EVAL
strengthening/ADL retraining/balance training/fine motor coordination training/parent/caregiver training.../ROM/cognitive, visual perceptual/neuromuscular re-education/transfer training/bed mobility training/motor coordination training

## 2018-11-02 NOTE — OCCUPATIONAL THERAPY INITIAL EVALUATION ADULT - IMPAIRMENTS CONTRIBUTING IMPAIRED BED MOBILITY, REHAB EVAL
impaired motor control/impaired postural control/decreased strength/impaired balance/abnormal muscle tone

## 2018-11-02 NOTE — OCCUPATIONAL THERAPY INITIAL EVALUATION ADULT - RANGE OF MOTION EXAMINATION, UPPER EXTREMITY
except right sh. 0-60 flex passive due to increased tone/Left UE Active ROM was WFL (within functional limits)/Right UE Passive ROM was WFL  (within functional limits)

## 2018-11-02 NOTE — CONSULT NOTE ADULT - ASSESSMENT
Impression:    1. Abdominal pain: Differential diagnosis includes peptic ulcer disease, erosive gastritis, NSAID gastropathy, viral gastroenteritis, and food poisoning. Less likely viral gastroenteritis, as the patient has not had diarrhea.   2. Abnormal liver enzymes: Differential diagnosis includes viral hepatitis and drug induced liver injury. Minimal concern for biliary pathology, as patient with no biliary ductal dilatation on abdominal ultrasound and normal bilirubin.   3. Hemorrhagic CVA due to aneurysmal rupture s/p craniotomy: Residual right sided deficits and expressive aphasia.  4. HTN  5. HLD    Recommendations:  - Check hepatitis A IgM, hepatitis B surface Ag, hepatitis B surface Ab, hepatitis B core antigen, hepatitis core Ab, hepatitis C Ab, and hepatitis E IgM  - Check CMV PCR, EBV PCR, HSV PCR  - Consider upper endoscopy if symptoms do not improve   - Supportive care per primary team    Lazarus Hoover MD  Gastroenterology Fellow  Pager number: [phone] / [phone] Impression:    1. Abdominal pain: Differential diagnosis includes peptic ulcer disease, erosive gastritis, NSAID gastropathy, viral gastroenteritis, and food poisoning. Less likely viral gastroenteritis, as the patient has not had diarrhea.   2. Abnormal liver enzymes: Differential diagnosis includes viral hepatitis and drug induced liver injury. Minimal concern for biliary pathology, as patient with no biliary ductal dilatation on abdominal ultrasound and normal bilirubin.   3. Hemorrhagic CVA due to aneurysmal rupture s/p craniotomy: Residual right sided deficits and expressive aphasia.  4. HTN  5. HLD    Recommendations:  - Check hepatitis A IgM, hepatitis B surface Ag, hepatitis B surface Ab, hepatitis B core antigen, hepatitis core Ab, hepatitis C Ab, and hepatitis E IgM  - Check CMV PCR, EBV PCR, HSV PCR  - Check abdominal x-ray   - Consider upper endoscopy if symptoms do not improve   - Supportive care per primary team    Lazarus Hoover MD  Gastroenterology Fellow  Pager number: [phone] / [phone]

## 2018-11-02 NOTE — PROGRESS NOTE ADULT - ATTENDING COMMENTS
Ot eval  need ct of a/p Ot eval  need ct of a/p once No CP /sob Barium in abd- will do Abd Xray in AM again  Gi consult requsted ? cholangitis now improving

## 2018-11-03 LAB
ALBUMIN SERPL ELPH-MCNC: 3.8 G/DL — SIGNIFICANT CHANGE UP (ref 3.3–5)
ALP SERPL-CCNC: 99 U/L — SIGNIFICANT CHANGE UP (ref 40–120)
ALT FLD-CCNC: 34 U/L — HIGH (ref 4–33)
AST SERPL-CCNC: 20 U/L — SIGNIFICANT CHANGE UP (ref 4–32)
BASOPHILS # BLD AUTO: 0.07 K/UL — SIGNIFICANT CHANGE UP (ref 0–0.2)
BASOPHILS NFR BLD AUTO: 0.5 % — SIGNIFICANT CHANGE UP (ref 0–2)
BILIRUB SERPL-MCNC: 0.5 MG/DL — SIGNIFICANT CHANGE UP (ref 0.2–1.2)
BUN SERPL-MCNC: 18 MG/DL — SIGNIFICANT CHANGE UP (ref 7–23)
CALCIUM SERPL-MCNC: 9.3 MG/DL — SIGNIFICANT CHANGE UP (ref 8.4–10.5)
CHLORIDE SERPL-SCNC: 99 MMOL/L — SIGNIFICANT CHANGE UP (ref 98–107)
CO2 SERPL-SCNC: 19 MMOL/L — LOW (ref 22–31)
CREAT SERPL-MCNC: 0.86 MG/DL — SIGNIFICANT CHANGE UP (ref 0.5–1.3)
EBV EA AB TITR SER IF: POSITIVE — SIGNIFICANT CHANGE UP
EBV EA IGG SER-ACNC: NEGATIVE — SIGNIFICANT CHANGE UP
EBV PATRN SPEC IB-IMP: SIGNIFICANT CHANGE UP
EBV VCA IGG AVIDITY SER QL IA: POSITIVE — SIGNIFICANT CHANGE UP
EBV VCA IGM TITR FLD: NEGATIVE — SIGNIFICANT CHANGE UP
EOSINOPHIL # BLD AUTO: 0.13 K/UL — SIGNIFICANT CHANGE UP (ref 0–0.5)
EOSINOPHIL NFR BLD AUTO: 1 % — SIGNIFICANT CHANGE UP (ref 0–6)
GLUCOSE SERPL-MCNC: 92 MG/DL — SIGNIFICANT CHANGE UP (ref 70–99)
HCT VFR BLD CALC: 40.5 % — SIGNIFICANT CHANGE UP (ref 34.5–45)
HGB BLD-MCNC: 14.4 G/DL — SIGNIFICANT CHANGE UP (ref 11.5–15.5)
IMM GRANULOCYTES # BLD AUTO: 0.04 # — SIGNIFICANT CHANGE UP
IMM GRANULOCYTES NFR BLD AUTO: 0.3 % — SIGNIFICANT CHANGE UP (ref 0–1.5)
LYMPHOCYTES # BLD AUTO: 37.8 % — SIGNIFICANT CHANGE UP (ref 13–44)
LYMPHOCYTES # BLD AUTO: 4.84 K/UL — HIGH (ref 1–3.3)
MCHC RBC-ENTMCNC: 33.1 PG — SIGNIFICANT CHANGE UP (ref 27–34)
MCHC RBC-ENTMCNC: 35.6 % — SIGNIFICANT CHANGE UP (ref 32–36)
MCV RBC AUTO: 93.1 FL — SIGNIFICANT CHANGE UP (ref 80–100)
MONOCYTES # BLD AUTO: 1.32 K/UL — HIGH (ref 0–0.9)
MONOCYTES NFR BLD AUTO: 10.3 % — SIGNIFICANT CHANGE UP (ref 2–14)
NEUTROPHILS # BLD AUTO: 6.4 K/UL — SIGNIFICANT CHANGE UP (ref 1.8–7.4)
NEUTROPHILS NFR BLD AUTO: 50.1 % — SIGNIFICANT CHANGE UP (ref 43–77)
NRBC # FLD: 0 — SIGNIFICANT CHANGE UP
PLATELET # BLD AUTO: 234 K/UL — SIGNIFICANT CHANGE UP (ref 150–400)
PMV BLD: 10.7 FL — SIGNIFICANT CHANGE UP (ref 7–13)
POTASSIUM SERPL-MCNC: 3.2 MMOL/L — LOW (ref 3.5–5.3)
POTASSIUM SERPL-SCNC: 3.2 MMOL/L — LOW (ref 3.5–5.3)
PROT SERPL-MCNC: 7 G/DL — SIGNIFICANT CHANGE UP (ref 6–8.3)
RBC # BLD: 4.35 M/UL — SIGNIFICANT CHANGE UP (ref 3.8–5.2)
RBC # FLD: 11.5 % — SIGNIFICANT CHANGE UP (ref 10.3–14.5)
SODIUM SERPL-SCNC: 137 MMOL/L — SIGNIFICANT CHANGE UP (ref 135–145)
WBC # BLD: 12.8 K/UL — HIGH (ref 3.8–10.5)
WBC # FLD AUTO: 12.8 K/UL — HIGH (ref 3.8–10.5)

## 2018-11-03 PROCEDURE — 99223 1ST HOSP IP/OBS HIGH 75: CPT | Mod: GC

## 2018-11-03 PROCEDURE — 99232 SBSQ HOSP IP/OBS MODERATE 35: CPT | Mod: GC

## 2018-11-03 PROCEDURE — 99233 SBSQ HOSP IP/OBS HIGH 50: CPT

## 2018-11-03 RX ORDER — POTASSIUM CHLORIDE 20 MEQ
10 PACKET (EA) ORAL
Qty: 0 | Refills: 0 | Status: COMPLETED | OUTPATIENT
Start: 2018-11-03 | End: 2018-11-03

## 2018-11-03 RX ORDER — DEXTROSE MONOHYDRATE, SODIUM CHLORIDE, AND POTASSIUM CHLORIDE 50; .745; 4.5 G/1000ML; G/1000ML; G/1000ML
1000 INJECTION, SOLUTION INTRAVENOUS
Qty: 0 | Refills: 0 | Status: DISCONTINUED | OUTPATIENT
Start: 2018-11-03 | End: 2018-11-04

## 2018-11-03 RX ORDER — LACOSAMIDE 50 MG/1
100 TABLET ORAL EVERY 12 HOURS
Qty: 0 | Refills: 0 | Status: DISCONTINUED | OUTPATIENT
Start: 2018-11-03 | End: 2018-11-05

## 2018-11-03 RX ADMIN — HEPARIN SODIUM 5000 UNIT(S): 5000 INJECTION INTRAVENOUS; SUBCUTANEOUS at 21:56

## 2018-11-03 RX ADMIN — HEPARIN SODIUM 5000 UNIT(S): 5000 INJECTION INTRAVENOUS; SUBCUTANEOUS at 05:26

## 2018-11-03 RX ADMIN — LACOSAMIDE 120 MILLIGRAM(S): 50 TABLET ORAL at 18:21

## 2018-11-03 RX ADMIN — ONDANSETRON 4 MILLIGRAM(S): 8 TABLET, FILM COATED ORAL at 20:26

## 2018-11-03 RX ADMIN — Medication 100 MILLIEQUIVALENT(S): at 19:08

## 2018-11-03 RX ADMIN — Medication 100 MILLIEQUIVALENT(S): at 11:17

## 2018-11-03 RX ADMIN — Medication 100 MILLIEQUIVALENT(S): at 14:31

## 2018-11-03 RX ADMIN — DEXTROSE MONOHYDRATE, SODIUM CHLORIDE, AND POTASSIUM CHLORIDE 70 MILLILITER(S): 50; .745; 4.5 INJECTION, SOLUTION INTRAVENOUS at 11:18

## 2018-11-03 RX ADMIN — HEPARIN SODIUM 5000 UNIT(S): 5000 INJECTION INTRAVENOUS; SUBCUTANEOUS at 14:32

## 2018-11-03 NOTE — SWALLOW BEDSIDE ASSESSMENT ADULT - COMMENTS
Patient presented as nonverbal, communicating through head nods for yes/no questions.  Yes/no questions answered with approximately 80% accuracy.  Patient attempted to use gestures however gestures were deemed inappropriate and unsuccessful in communicating with the clinician.  Patient asked to produce /a/ in which limited oral grading was noted with low vocal volume and low speech production was noted when asked to count 1-10.  Patient only produced 4/10 of the numbers with severe unintelligibility.    Patient was seen for a bedside swallowing evaluation on 10/24 (see documents for full findings) and a Modified barium swallow study on 10/25/2018 (see documents for full findings).    ***Patient refused all solid trials, accepting only water during this evaluation*** Patient presented as nonverbal, communicating through head nods for yes/no questions.   Patient asked to produce /a/ in which limited oral grading was noted with low vocal volume.     Patient was seen for a bedside swallowing evaluation on 10/24 (see documents for full findings) and a Modified barium swallow study on 10/25/2018 (see documents for full findings).    ***Patient refused all solid trials, accepting only water during this evaluation***

## 2018-11-03 NOTE — SWALLOW BEDSIDE ASSESSMENT ADULT - ADDITIONAL RECOMMENDATIONS
This department to follow for swallow therapy as schedule permits during this acute care stay. Patient would benefit from continued swallow therapy upon discharge to appropriate setting (rehab vs home care vs outpatient services). Outpatient services at [facility] can be reached at [phone].  2, Patient would benefit from comprehensive speech and language evaluation upon discharge to appropriate setting.  Outpatient speech and language evaluation can be performed at [facility].  Contact to this facility [phone].

## 2018-11-03 NOTE — SWALLOW BEDSIDE ASSESSMENT ADULT - SWALLOW EVAL: DIAGNOSIS
1. Patient presented with functional oral stage for thin liquids marked by adequate oral containment, bolus formation, transfer and clearance.  2. Patient presented with mild pharyngeal stage dysphagia for thin liquids marked by mildly delayed swallow trigger, adequate hyolaryngeal elevation and no overt s/s of penetration/aspiration.  3. Patient refused all solid trials during today's evaluation despite maximum encouragement and a variety of options.

## 2018-11-03 NOTE — PROGRESS NOTE ADULT - SUBJECTIVE AND OBJECTIVE BOX
Patient is a 63y old  Female who presents with a chief complaint of unable to ambulate (02 Nov 2018 10:46)      patient seen and examine at bed side   non verbal   labs reviewed , hypokalemia   still NPO       MEDICATIONS  (STANDING):  aspirin enteric coated 81 milliGRAM(s) Oral daily  atorvastatin 80 milliGRAM(s) Oral at bedtime  dextrose 5% + sodium chloride 0.9% with potassium chloride 20 mEq/L 1000 milliLiter(s) (70 mL/Hr) IV Continuous <Continuous>  docusate sodium 100 milliGRAM(s) Oral three times a day  heparin  Injectable 5000 Unit(s) SubCutaneous every 8 hours  influenza   Vaccine 0.5 milliLiter(s) IntraMuscular once  lacosamide IVPB 100 milliGRAM(s) IV Intermittent every 12 hours  lactulose Syrup 10 Gram(s) Oral every 6 hours  NIFEdipine XL 90 milliGRAM(s) Oral daily  polyethylene glycol 3350 17 Gram(s) Oral daily  potassium chloride  10 mEq/100 mL IVPB 10 milliEquivalent(s) IV Intermittent every 1 hour  senna 2 Tablet(s) Oral at bedtime  spironolactone 25 milliGRAM(s) Oral daily    MEDICATIONS  (PRN):  acetaminophen   Tablet .. 650 milliGRAM(s) Oral every 6 hours PRN Temp greater or equal to 38C (100.4F), Mild Pain (1 - 3), Moderate Pain (4 - 6)  ondansetron Injectable 4 milliGRAM(s) IV Push every 8 hours PRN Nausea and/or Vomiting      Vital Signs Last 24 Hrs  T(C): 36.5 (03 Nov 2018 05:16), Max: 36.7 (02 Nov 2018 13:20)  T(F): 97.7 (03 Nov 2018 05:16), Max: 98 (02 Nov 2018 13:20)  HR: 80 (03 Nov 2018 05:16) (80 - 83)  BP: 149/93 (03 Nov 2018 05:16) (132/82 - 149/93)  BP(mean): --  RR: 20 (03 Nov 2018 05:16) (18 - 20)  SpO2: 96% (03 Nov 2018 05:16) (96% - 100%)    GENERAL: NAD, well-developed  HEAD:  Atraumatic, Normocephalic  EYES: EOMI, PERRLA, conjunctiva and sclera clear  NECK: Supple, No JVD  CHEST/LUNG: Clear to auscultation bilaterally; No wheeze  HEART: Regular rate and rhythm; No murmurs, rubs, or gallops  ABDOMEN: Soft, Nontender, Nondistended; Bowel sounds present  EXTREMITIES:  2+ Peripheral Pulses, No clubbing, cyanosis, or edema  R hand contracted  PSYCH: Alert  NEUROLOGY: R hemiperesis  SKIN: No rashes or lesions    LABS:                                        14.4   12.80 )-----------( 234      ( 03 Nov 2018 06:05 )             40.5       11-03    137  |  99  |  18  ----------------------------<  92  3.2<L>   |  19<L>  |  0.86    Ca    9.3      03 Nov 2018 06:05    TPro  7.0  /  Alb  3.8  /  TBili  0.5  /  DBili  x   /  AST  20  /  ALT  34<H>  /  AlkPhos  99  11-03      RADIOLOGY & ADDITIONAL TESTS:    Imaging Personally Reviewed:    Consultant(s) Notes Reviewed:      Care Discussed with Consultants/Other Providers: GI

## 2018-11-03 NOTE — SWALLOW BEDSIDE ASSESSMENT ADULT - ASR SWALLOW ASPIRATION MONITOR
pneumonia/change of breathing pattern/position upright (90Y)/fever/throat clearing/upper respiratory infection/oral hygiene/gurgly voice/cough
cough/pneumonia/upper respiratory infection/change of breathing pattern/fever/throat clearing/gurgly voice

## 2018-11-03 NOTE — SWALLOW BEDSIDE ASSESSMENT ADULT - SLP PERTINENT HISTORY OF CURRENT PROBLEM
63y f w PMHx strokes in the past p/w R sided paralysis s/p stroke. Pt had several stroke 15yrs ago and was apparently diagnosed w/ a brain aneurysm, which was stable for many years. She has been out of the country for the last year and 3 months ago had another stroke, which left her paralysed on the R upper and lower extremities and with R sided facial paralysis. No left sided deficits. She arrived in the [place] today and her daughter picked her up from airport, not expecting the extent of neurological deficits. The pt was immediately brought to the ED by her daughter for evaluation.

## 2018-11-03 NOTE — SWALLOW BEDSIDE ASSESSMENT ADULT - ASR SWALLOW LINGUAL MOBILITY
unable to adequately assess due to a suspected apraxia component given oral groping behaviors upon patient attempts to imitate simple oromotor movements
impaired left lateral movement/impaired right lateral movement/impaired anterior elevation/impaired protrusion

## 2018-11-03 NOTE — SWALLOW BEDSIDE ASSESSMENT ADULT - SWALLOW EVAL: RECOMMENDED DIET
Continue thin liquids.  Defer solid intake to MD.
1) Dysphagia 1 (Puree) with Honey-Thick Liquids; 2) Cinesophagram to objectively assess the swallow mechanism and r/o silent aspiration

## 2018-11-03 NOTE — SWALLOW BEDSIDE ASSESSMENT ADULT - SWALLOW EVAL: CURRENT DIET
NPO
NPO - nursing reports difficulty swallowing medication.  Recommended Mechanical soft solids and thin liquids on previous MBS performed on 10/25/2018.

## 2018-11-03 NOTE — SWALLOW BEDSIDE ASSESSMENT ADULT - ASR SWALLOW LABIAL MOBILITY
unable to adequately assess due to a suspected apraxia component given oral groping behaviors upon patient attempts to imitate simple oromotor movements
impaired seal/impaired pursing/impaired coordination

## 2018-11-03 NOTE — PROGRESS NOTE ADULT - PROBLEM SELECTOR PLAN 5
Lactulose until BM- F/u x ray in 4 8 hrs Lactulose until BM-repeat CT once barium out of bowel   F/u with GI   f/u swallow

## 2018-11-03 NOTE — PROGRESS NOTE ADULT - PROBLEM SELECTOR PLAN 1
subacute stroke with extensive left carotid dz.   clip was placed 12 years ago in a hospital in [place], unclear if compatible with MRI but will not  at this time. CTA H/N done instead. No vascular intervention  c/w ASA 81mg daily, statin, dysphagia diet  TTE unremarkable, EF: 63%  Awaiting rehab

## 2018-11-03 NOTE — PROGRESS NOTE ADULT - SUBJECTIVE AND OBJECTIVE BOX
Follow Up:  adb pain, transaminitis    Interval History: pt stable and afebrile, denied any abd pain or vomiting     ROS:      All other systems negative    Constitutional: no fever, no chills  Head: no trauma  Eyes: no vision changes, no eye pain  ENT:  no sore throat, no rhinorrhea  Cardiovascular:  no chest pain, no palpitation  Respiratory:  no SOB, no cough  GI:  no abd pain, no vomiting, no diarrhea  urinary: no dysuria, no hematuria, no flank pain  musculoskeletal:  no joint pain, no joint swelling  skin:  no rash  neurology:  no headache, no seizure          Allergies  No Known Allergies        ANTIMICROBIALS:      OTHER MEDS:  acetaminophen   Tablet .. 650 milliGRAM(s) Oral every 6 hours PRN  aspirin enteric coated 81 milliGRAM(s) Oral daily  atorvastatin 80 milliGRAM(s) Oral at bedtime  dextrose 5% + sodium chloride 0.9% with potassium chloride 20 mEq/L 1000 milliLiter(s) IV Continuous <Continuous>  docusate sodium 100 milliGRAM(s) Oral three times a day  heparin  Injectable 5000 Unit(s) SubCutaneous every 8 hours  influenza   Vaccine 0.5 milliLiter(s) IntraMuscular once  lacosamide IVPB 100 milliGRAM(s) IV Intermittent every 12 hours  lactulose Syrup 10 Gram(s) Oral every 6 hours  NIFEdipine XL 90 milliGRAM(s) Oral daily  ondansetron Injectable 4 milliGRAM(s) IV Push every 8 hours PRN  polyethylene glycol 3350 17 Gram(s) Oral daily  potassium chloride  10 mEq/100 mL IVPB 10 milliEquivalent(s) IV Intermittent every 1 hour  senna 2 Tablet(s) Oral at bedtime  spironolactone 25 milliGRAM(s) Oral daily      Vital Signs Last 24 Hrs  T(C): 36.9 (03 Nov 2018 14:05), Max: 36.9 (03 Nov 2018 14:05)  T(F): 98.4 (03 Nov 2018 14:05), Max: 98.4 (03 Nov 2018 14:05)  HR: 67 (03 Nov 2018 14:05) (67 - 83)  BP: 138/84 (03 Nov 2018 14:05) (132/82 - 149/93)  BP(mean): --  RR: 18 (03 Nov 2018 14:05) (18 - 20)  SpO2: 99% (03 Nov 2018 14:05) (96% - 100%)    Physical Exam:  General:    NAD,  non toxic  Head: atraumatic, normocephalic  Eye: normal sclera and conjunctiva  ENT:    no oropharyngeal lesions,   no LAD,   neck supple  Cardio:     regular S1, S2,  no murmur  Respiratory:    clear b/l,    no wheezing  abd:     soft,   BS +,   no tenderness,    no organomegaly  :   no CVAT,  no suprapubic tenderness,   no  balderas  Musculoskeletal:   no joint swelling,   no edema  vascular: no lines, normal pulses  Skin:    no rash  Neurologic:    expressive aphasia after CVA but answers questions by nodding yes or no                            14.4   12.80 )-----------( 234      ( 03 Nov 2018 06:05 )             40.5       11-03    137  |  99  |  18  ----------------------------<  92  3.2<L>   |  19<L>  |  0.86    Ca    9.3      03 Nov 2018 06:05    TPro  7.0  /  Alb  3.8  /  TBili  0.5  /  DBili  x   /  AST  20  /  ALT  34<H>  /  AlkPhos  99  11-03              RADIOLOGY:  Images below reviewed personally  < from: Xray Abdomen 1 View PORTABLE -Routine (11.01.18 @ 08:15) >    IMPRESSION: Nonobstructive bowel gas pattern. Oral contrast opacifies   descending and sigmoid colon and the rectum, similar to  radiograph   from incomplete CT abdomen and pelvis 10/31/2018.      < from: US Abdomen Limited (10.30.18 @ 10:48) >  IMPRESSION:     Hepatic steatosis.

## 2018-11-04 LAB
ALBUMIN SERPL ELPH-MCNC: 3.8 G/DL — SIGNIFICANT CHANGE UP (ref 3.3–5)
ALP SERPL-CCNC: 104 U/L — SIGNIFICANT CHANGE UP (ref 40–120)
ALT FLD-CCNC: 41 U/L — HIGH (ref 4–33)
AST SERPL-CCNC: 33 U/L — HIGH (ref 4–32)
BASOPHILS # BLD AUTO: 0.05 K/UL — SIGNIFICANT CHANGE UP (ref 0–0.2)
BASOPHILS NFR BLD AUTO: 0.4 % — SIGNIFICANT CHANGE UP (ref 0–2)
BILIRUB SERPL-MCNC: 0.5 MG/DL — SIGNIFICANT CHANGE UP (ref 0.2–1.2)
BUN SERPL-MCNC: 20 MG/DL — SIGNIFICANT CHANGE UP (ref 7–23)
BUN SERPL-MCNC: 20 MG/DL — SIGNIFICANT CHANGE UP (ref 7–23)
CALCIUM SERPL-MCNC: 9.3 MG/DL — SIGNIFICANT CHANGE UP (ref 8.4–10.5)
CALCIUM SERPL-MCNC: 9.6 MG/DL — SIGNIFICANT CHANGE UP (ref 8.4–10.5)
CHLORIDE SERPL-SCNC: 102 MMOL/L — SIGNIFICANT CHANGE UP (ref 98–107)
CHLORIDE SERPL-SCNC: 105 MMOL/L — SIGNIFICANT CHANGE UP (ref 98–107)
CO2 SERPL-SCNC: 19 MMOL/L — LOW (ref 22–31)
CO2 SERPL-SCNC: 21 MMOL/L — LOW (ref 22–31)
CREAT SERPL-MCNC: 0.75 MG/DL — SIGNIFICANT CHANGE UP (ref 0.5–1.3)
CREAT SERPL-MCNC: 0.77 MG/DL — SIGNIFICANT CHANGE UP (ref 0.5–1.3)
EOSINOPHIL # BLD AUTO: 0.1 K/UL — SIGNIFICANT CHANGE UP (ref 0–0.5)
EOSINOPHIL NFR BLD AUTO: 0.8 % — SIGNIFICANT CHANGE UP (ref 0–6)
GLUCOSE SERPL-MCNC: 78 MG/DL — SIGNIFICANT CHANGE UP (ref 70–99)
GLUCOSE SERPL-MCNC: 79 MG/DL — SIGNIFICANT CHANGE UP (ref 70–99)
HAV IGM SER-ACNC: NONREACTIVE — SIGNIFICANT CHANGE UP
HBV SURFACE AB SER-ACNC: REACTIVE — SIGNIFICANT CHANGE UP
HBV SURFACE AG SER-ACNC: NEGATIVE — SIGNIFICANT CHANGE UP
HCT VFR BLD CALC: 40.6 % — SIGNIFICANT CHANGE UP (ref 34.5–45)
HGB BLD-MCNC: 14.1 G/DL — SIGNIFICANT CHANGE UP (ref 11.5–15.5)
IMM GRANULOCYTES # BLD AUTO: 0.05 # — SIGNIFICANT CHANGE UP
IMM GRANULOCYTES NFR BLD AUTO: 0.4 % — SIGNIFICANT CHANGE UP (ref 0–1.5)
LYMPHOCYTES # BLD AUTO: 27.6 % — SIGNIFICANT CHANGE UP (ref 13–44)
LYMPHOCYTES # BLD AUTO: 3.27 K/UL — SIGNIFICANT CHANGE UP (ref 1–3.3)
MAGNESIUM SERPL-MCNC: 1.9 MG/DL — SIGNIFICANT CHANGE UP (ref 1.6–2.6)
MAGNESIUM SERPL-MCNC: 2 MG/DL — SIGNIFICANT CHANGE UP (ref 1.6–2.6)
MCHC RBC-ENTMCNC: 32.9 PG — SIGNIFICANT CHANGE UP (ref 27–34)
MCHC RBC-ENTMCNC: 34.7 % — SIGNIFICANT CHANGE UP (ref 32–36)
MCV RBC AUTO: 94.9 FL — SIGNIFICANT CHANGE UP (ref 80–100)
MONOCYTES # BLD AUTO: 1.02 K/UL — HIGH (ref 0–0.9)
MONOCYTES NFR BLD AUTO: 8.6 % — SIGNIFICANT CHANGE UP (ref 2–14)
NEUTROPHILS # BLD AUTO: 7.37 K/UL — SIGNIFICANT CHANGE UP (ref 1.8–7.4)
NEUTROPHILS NFR BLD AUTO: 62.2 % — SIGNIFICANT CHANGE UP (ref 43–77)
NRBC # FLD: 0 — SIGNIFICANT CHANGE UP
PHOSPHATE SERPL-MCNC: 3.4 MG/DL — SIGNIFICANT CHANGE UP (ref 2.5–4.5)
PHOSPHATE SERPL-MCNC: 3.9 MG/DL — SIGNIFICANT CHANGE UP (ref 2.5–4.5)
PLATELET # BLD AUTO: 227 K/UL — SIGNIFICANT CHANGE UP (ref 150–400)
PMV BLD: 10.6 FL — SIGNIFICANT CHANGE UP (ref 7–13)
POTASSIUM SERPL-MCNC: 3.5 MMOL/L — SIGNIFICANT CHANGE UP (ref 3.5–5.3)
POTASSIUM SERPL-MCNC: 3.8 MMOL/L — SIGNIFICANT CHANGE UP (ref 3.5–5.3)
POTASSIUM SERPL-SCNC: 3.5 MMOL/L — SIGNIFICANT CHANGE UP (ref 3.5–5.3)
POTASSIUM SERPL-SCNC: 3.8 MMOL/L — SIGNIFICANT CHANGE UP (ref 3.5–5.3)
PROT SERPL-MCNC: 7.2 G/DL — SIGNIFICANT CHANGE UP (ref 6–8.3)
RBC # BLD: 4.28 M/UL — SIGNIFICANT CHANGE UP (ref 3.8–5.2)
RBC # FLD: 11.7 % — SIGNIFICANT CHANGE UP (ref 10.3–14.5)
SODIUM SERPL-SCNC: 138 MMOL/L — SIGNIFICANT CHANGE UP (ref 135–145)
SODIUM SERPL-SCNC: 140 MMOL/L — SIGNIFICANT CHANGE UP (ref 135–145)
WBC # BLD: 11.86 K/UL — HIGH (ref 3.8–10.5)
WBC # FLD AUTO: 11.86 K/UL — HIGH (ref 3.8–10.5)

## 2018-11-04 PROCEDURE — 99233 SBSQ HOSP IP/OBS HIGH 50: CPT

## 2018-11-04 PROCEDURE — 74018 RADEX ABDOMEN 1 VIEW: CPT | Mod: 26

## 2018-11-04 RX ORDER — SODIUM CHLORIDE 9 MG/ML
1000 INJECTION, SOLUTION INTRAVENOUS
Qty: 0 | Refills: 0 | Status: DISCONTINUED | OUTPATIENT
Start: 2018-11-04 | End: 2018-11-05

## 2018-11-04 RX ADMIN — LACOSAMIDE 120 MILLIGRAM(S): 50 TABLET ORAL at 06:52

## 2018-11-04 RX ADMIN — LACOSAMIDE 120 MILLIGRAM(S): 50 TABLET ORAL at 17:31

## 2018-11-04 RX ADMIN — HEPARIN SODIUM 5000 UNIT(S): 5000 INJECTION INTRAVENOUS; SUBCUTANEOUS at 17:31

## 2018-11-04 RX ADMIN — HEPARIN SODIUM 5000 UNIT(S): 5000 INJECTION INTRAVENOUS; SUBCUTANEOUS at 06:52

## 2018-11-04 RX ADMIN — HEPARIN SODIUM 5000 UNIT(S): 5000 INJECTION INTRAVENOUS; SUBCUTANEOUS at 21:20

## 2018-11-04 RX ADMIN — SODIUM CHLORIDE 50 MILLILITER(S): 9 INJECTION, SOLUTION INTRAVENOUS at 13:22

## 2018-11-04 NOTE — PROGRESS NOTE ADULT - SUBJECTIVE AND OBJECTIVE BOX
Patient is a 63y old  Female who presents with a chief complaint of unable to ambulate (02 Nov 2018 10:46)      patient seen and examine at bed side   non verbal   still NPO       MEDICATIONS  (STANDING):  aspirin enteric coated 81 milliGRAM(s) Oral daily  atorvastatin 80 milliGRAM(s) Oral at bedtime  dextrose 5% + sodium chloride 0.9% with potassium chloride 20 mEq/L 1000 milliLiter(s) (70 mL/Hr) IV Continuous <Continuous>  docusate sodium 100 milliGRAM(s) Oral three times a day  heparin  Injectable 5000 Unit(s) SubCutaneous every 8 hours  influenza   Vaccine 0.5 milliLiter(s) IntraMuscular once  lacosamide IVPB 100 milliGRAM(s) IV Intermittent every 12 hours  lactulose Syrup 10 Gram(s) Oral every 6 hours  NIFEdipine XL 90 milliGRAM(s) Oral daily  polyethylene glycol 3350 17 Gram(s) Oral daily  senna 2 Tablet(s) Oral at bedtime  spironolactone 25 milliGRAM(s) Oral daily    MEDICATIONS  (PRN):  acetaminophen   Tablet .. 650 milliGRAM(s) Oral every 6 hours PRN Temp greater or equal to 38C (100.4F), Mild Pain (1 - 3), Moderate Pain (4 - 6)  ondansetron Injectable 4 milliGRAM(s) IV Push every 8 hours PRN Nausea and/or Vomiting      Vital Signs Last 24 Hrs  T(C): 36.6 (04 Nov 2018 05:52), Max: 36.9 (03 Nov 2018 14:05)  T(F): 97.9 (04 Nov 2018 05:52), Max: 98.4 (03 Nov 2018 14:05)  HR: 62 (04 Nov 2018 05:52) (62 - 69)  BP: 142/78 (04 Nov 2018 05:52) (136/91 - 142/78)  BP(mean): --  RR: 18 (04 Nov 2018 05:52) (18 - 18)  SpO2: 100% (04 Nov 2018 05:52) (98% - 100%)      GENERAL: NAD, well-developed  HEAD:  Atraumatic, Normocephalic  EYES: EOMI, PERRLA, conjunctiva and sclera clear  NECK: Supple, No JVD  CHEST/LUNG: Clear to auscultation bilaterally; No wheeze  HEART: Regular rate and rhythm; No murmurs, rubs, or gallops  ABDOMEN: Soft, Nontender, Nondistended; Bowel sounds present  EXTREMITIES:  2+ Peripheral Pulses, No clubbing, cyanosis, or edema  R hand contracted  PSYCH: Alert  NEUROLOGY: R hemiperesis  SKIN: No rashes or lesions    LABS:                                                   14.1   11.86 )-----------( 227      ( 04 Nov 2018 07:10 )             40.6       11-04    140  |  105  |  20  ----------------------------<  79  3.8   |  21<L>  |  0.77    Ca    9.6      04 Nov 2018 07:10  Phos  3.9     11-04  Mg     2.0     11-04    TPro  7.2  /  Alb  3.8  /  TBili  0.5  /  DBili  x   /  AST  33<H>  /  ALT  41<H>  /  AlkPhos  104  11-04        RADIOLOGY & ADDITIONAL TESTS:    Imaging Personally Reviewed:    Consultant(s) Notes Reviewed:      Care Discussed with Consultants/Other Providers: GI

## 2018-11-04 NOTE — PROGRESS NOTE ADULT - PROBLEM SELECTOR PLAN 1
subacute stroke with extensive left carotid dz.   clip was placed 12 years ago in a hospital in [place], unclear if compatible with MRI but will not  at this time. CTA H/N done instead. No vascular intervention  c/w ASA 81mg daily, statin, dysphagia diet  TTE unremarkable, EF: 63%  Awaiting rehab

## 2018-11-05 DIAGNOSIS — M79.2 NEURALGIA AND NEURITIS, UNSPECIFIED: ICD-10-CM

## 2018-11-05 LAB
ALBUMIN SERPL ELPH-MCNC: 3.9 G/DL — SIGNIFICANT CHANGE UP (ref 3.3–5)
ALP SERPL-CCNC: 99 U/L — SIGNIFICANT CHANGE UP (ref 40–120)
ALT FLD-CCNC: 49 U/L — HIGH (ref 4–33)
AST SERPL-CCNC: 37 U/L — HIGH (ref 4–32)
BASOPHILS # BLD AUTO: 0.04 K/UL — SIGNIFICANT CHANGE UP (ref 0–0.2)
BASOPHILS NFR BLD AUTO: 0.4 % — SIGNIFICANT CHANGE UP (ref 0–2)
BILIRUB SERPL-MCNC: 0.4 MG/DL — SIGNIFICANT CHANGE UP (ref 0.2–1.2)
BUN SERPL-MCNC: 16 MG/DL — SIGNIFICANT CHANGE UP (ref 7–23)
CALCIUM SERPL-MCNC: 9.4 MG/DL — SIGNIFICANT CHANGE UP (ref 8.4–10.5)
CHLORIDE SERPL-SCNC: 101 MMOL/L — SIGNIFICANT CHANGE UP (ref 98–107)
CMV DNA CSF QL NAA+PROBE: NOT DETECTED IU/ML — SIGNIFICANT CHANGE UP
CMV DNA SPEC NAA+PROBE-LOG#: SIGNIFICANT CHANGE UP LOGIU/ML
CO2 SERPL-SCNC: 22 MMOL/L — SIGNIFICANT CHANGE UP (ref 22–31)
CREAT SERPL-MCNC: 0.7 MG/DL — SIGNIFICANT CHANGE UP (ref 0.5–1.3)
EOSINOPHIL # BLD AUTO: 0.12 K/UL — SIGNIFICANT CHANGE UP (ref 0–0.5)
EOSINOPHIL NFR BLD AUTO: 1.1 % — SIGNIFICANT CHANGE UP (ref 0–6)
GLUCOSE SERPL-MCNC: 100 MG/DL — HIGH (ref 70–99)
HBV DNA # SERPL NAA+PROBE: NOT DETECTED — SIGNIFICANT CHANGE UP
HBV DNA SERPL NAA+PROBE-LOG#: SIGNIFICANT CHANGE UP LOGIU/ML
HCT VFR BLD CALC: 39.1 % — SIGNIFICANT CHANGE UP (ref 34.5–45)
HGB BLD-MCNC: 13.4 G/DL — SIGNIFICANT CHANGE UP (ref 11.5–15.5)
IMM GRANULOCYTES # BLD AUTO: 0.06 # — SIGNIFICANT CHANGE UP
IMM GRANULOCYTES NFR BLD AUTO: 0.5 % — SIGNIFICANT CHANGE UP (ref 0–1.5)
LYMPHOCYTES # BLD AUTO: 27.7 % — SIGNIFICANT CHANGE UP (ref 13–44)
LYMPHOCYTES # BLD AUTO: 3.08 K/UL — SIGNIFICANT CHANGE UP (ref 1–3.3)
MAGNESIUM SERPL-MCNC: 1.9 MG/DL — SIGNIFICANT CHANGE UP (ref 1.6–2.6)
MCHC RBC-ENTMCNC: 32.5 PG — SIGNIFICANT CHANGE UP (ref 27–34)
MCHC RBC-ENTMCNC: 34.3 % — SIGNIFICANT CHANGE UP (ref 32–36)
MCV RBC AUTO: 94.9 FL — SIGNIFICANT CHANGE UP (ref 80–100)
MONOCYTES # BLD AUTO: 1.02 K/UL — HIGH (ref 0–0.9)
MONOCYTES NFR BLD AUTO: 9.2 % — SIGNIFICANT CHANGE UP (ref 2–14)
NEUTROPHILS # BLD AUTO: 6.78 K/UL — SIGNIFICANT CHANGE UP (ref 1.8–7.4)
NEUTROPHILS NFR BLD AUTO: 61.1 % — SIGNIFICANT CHANGE UP (ref 43–77)
NRBC # FLD: 0 — SIGNIFICANT CHANGE UP
PHOSPHATE SERPL-MCNC: 3.3 MG/DL — SIGNIFICANT CHANGE UP (ref 2.5–4.5)
PLATELET # BLD AUTO: 234 K/UL — SIGNIFICANT CHANGE UP (ref 150–400)
PMV BLD: 10.8 FL — SIGNIFICANT CHANGE UP (ref 7–13)
POTASSIUM SERPL-MCNC: 3 MMOL/L — LOW (ref 3.5–5.3)
POTASSIUM SERPL-SCNC: 3 MMOL/L — LOW (ref 3.5–5.3)
PROT SERPL-MCNC: 7.2 G/DL — SIGNIFICANT CHANGE UP (ref 6–8.3)
RBC # BLD: 4.12 M/UL — SIGNIFICANT CHANGE UP (ref 3.8–5.2)
RBC # FLD: 11.4 % — SIGNIFICANT CHANGE UP (ref 10.3–14.5)
SODIUM SERPL-SCNC: 139 MMOL/L — SIGNIFICANT CHANGE UP (ref 135–145)
WBC # BLD: 11.1 K/UL — HIGH (ref 3.8–10.5)
WBC # FLD AUTO: 11.1 K/UL — HIGH (ref 3.8–10.5)

## 2018-11-05 PROCEDURE — 99233 SBSQ HOSP IP/OBS HIGH 50: CPT

## 2018-11-05 PROCEDURE — 74230 X-RAY XM SWLNG FUNCJ C+: CPT | Mod: 26

## 2018-11-05 PROCEDURE — 99232 SBSQ HOSP IP/OBS MODERATE 35: CPT

## 2018-11-05 RX ORDER — GABAPENTIN 400 MG/1
300 CAPSULE ORAL AT BEDTIME
Qty: 0 | Refills: 0 | Status: DISCONTINUED | OUTPATIENT
Start: 2018-11-05 | End: 2018-11-07

## 2018-11-05 RX ORDER — POTASSIUM CHLORIDE 20 MEQ
10 PACKET (EA) ORAL
Qty: 0 | Refills: 0 | Status: COMPLETED | OUTPATIENT
Start: 2018-11-05 | End: 2018-11-05

## 2018-11-05 RX ORDER — LACOSAMIDE 50 MG/1
100 TABLET ORAL
Qty: 0 | Refills: 0 | Status: DISCONTINUED | OUTPATIENT
Start: 2018-11-05 | End: 2018-11-05

## 2018-11-05 RX ORDER — SOD SULF/SODIUM/NAHCO3/KCL/PEG
500 SOLUTION, RECONSTITUTED, ORAL ORAL ONCE
Qty: 0 | Refills: 0 | Status: COMPLETED | OUTPATIENT
Start: 2018-11-05 | End: 2018-11-05

## 2018-11-05 RX ORDER — LACOSAMIDE 50 MG/1
100 TABLET ORAL EVERY 12 HOURS
Qty: 0 | Refills: 0 | Status: DISCONTINUED | OUTPATIENT
Start: 2018-11-05 | End: 2018-11-07

## 2018-11-05 RX ORDER — ONDANSETRON 8 MG/1
4 TABLET, FILM COATED ORAL EVERY 8 HOURS
Qty: 0 | Refills: 0 | Status: DISCONTINUED | OUTPATIENT
Start: 2018-11-05 | End: 2018-11-07

## 2018-11-05 RX ADMIN — Medication 100 MILLIGRAM(S): at 14:46

## 2018-11-05 RX ADMIN — LACOSAMIDE 120 MILLIGRAM(S): 50 TABLET ORAL at 18:52

## 2018-11-05 RX ADMIN — POLYETHYLENE GLYCOL 3350 17 GRAM(S): 17 POWDER, FOR SOLUTION ORAL at 11:11

## 2018-11-05 RX ADMIN — Medication 500 MILLILITER(S): at 14:47

## 2018-11-05 RX ADMIN — HEPARIN SODIUM 5000 UNIT(S): 5000 INJECTION INTRAVENOUS; SUBCUTANEOUS at 14:46

## 2018-11-05 RX ADMIN — Medication 100 MILLIEQUIVALENT(S): at 13:14

## 2018-11-05 RX ADMIN — Medication 100 MILLIEQUIVALENT(S): at 09:02

## 2018-11-05 RX ADMIN — Medication 100 MILLIEQUIVALENT(S): at 10:44

## 2018-11-05 RX ADMIN — HEPARIN SODIUM 5000 UNIT(S): 5000 INJECTION INTRAVENOUS; SUBCUTANEOUS at 05:49

## 2018-11-05 RX ADMIN — LACOSAMIDE 120 MILLIGRAM(S): 50 TABLET ORAL at 05:48

## 2018-11-05 RX ADMIN — LACTULOSE 10 GRAM(S): 10 SOLUTION ORAL at 11:11

## 2018-11-05 RX ADMIN — HEPARIN SODIUM 5000 UNIT(S): 5000 INJECTION INTRAVENOUS; SUBCUTANEOUS at 22:22

## 2018-11-05 RX ADMIN — Medication 81 MILLIGRAM(S): at 11:11

## 2018-11-05 NOTE — PROGRESS NOTE ADULT - SUBJECTIVE AND OBJECTIVE BOX
Patient is a 63y old  Female who presents with a chief complaint of unable to ambulate (05 Nov 2018 08:47)      SUBJECTIVE / OVERNIGHT EVENTS:  Patient was having trouble with  swallowing food- Skylar-esophagram ordered  No more N/v  No more abd pain    MEDICATIONS  (STANDING):  aspirin enteric coated 81 milliGRAM(s) Oral daily  atorvastatin 80 milliGRAM(s) Oral at bedtime  dextrose 5% + sodium chloride 0.9%. 1000 milliLiter(s) (50 mL/Hr) IV Continuous <Continuous>  docusate sodium 100 milliGRAM(s) Oral three times a day  gabapentin 300 milliGRAM(s) Oral at bedtime  heparin  Injectable 5000 Unit(s) SubCutaneous every 8 hours  influenza   Vaccine 0.5 milliLiter(s) IntraMuscular once  lacosamide IVPB 100 milliGRAM(s) IV Intermittent every 12 hours  lactulose Syrup 10 Gram(s) Oral every 6 hours  NIFEdipine XL 90 milliGRAM(s) Oral daily  polyethylene glycol 3350 17 Gram(s) Oral daily  potassium chloride  10 mEq/100 mL IVPB 10 milliEquivalent(s) IV Intermittent every 1 hour  senna 2 Tablet(s) Oral at bedtime  spironolactone 25 milliGRAM(s) Oral daily    MEDICATIONS  (PRN):  acetaminophen   Tablet .. 650 milliGRAM(s) Oral every 6 hours PRN Temp greater or equal to 38C (100.4F), Mild Pain (1 - 3), Moderate Pain (4 - 6)  ondansetron Injectable 4 milliGRAM(s) IV Push every 8 hours PRN Nausea and/or Vomiting      PHYSICAL EXAM:  Vital Signs Last 24 Hrs  T(C): 37.2 (05 Nov 2018 06:12), Max: 37.2 (05 Nov 2018 06:12)  T(F): 99 (05 Nov 2018 06:12), Max: 99 (05 Nov 2018 06:12)  HR: 61 (05 Nov 2018 06:12) (57 - 62)  BP: 140/80 (05 Nov 2018 06:12) (140/80 - 145/79)  BP(mean): --  RR: 18 (05 Nov 2018 06:12) (18 - 18)  SpO2: 100% (05 Nov 2018 06:12) (100% - 100%)  GENERAL: NAD, well-developed  Patient nods her head to questions.- No pain now  HEAD:  Atraumatic, Normocephalic  EYES: EOMI, PERRLA, conjunctiva and sclera clear  NECK: Supple, No JVD  CHEST/LUNG: Clear to auscultation bilaterally; No wheeze  HEART: Regular rate and rhythm; No murmurs, rubs, or gallops  ABDOMEN: Soft, Nontender, Nondistended; Bowel sounds present  EXTREMITIES:  2+ Peripheral Pulses, No clubbing, cyanosis, or edema  R arm contracted.  No strength  to R arm and legs  PSYCH: Alert and non- verbal.    LABS:                        13.4   11.10 )-----------( 234      ( 05 Nov 2018 05:55 )             39.1     11-05    139  |  101  |  16  ----------------------------<  100<H>  3.0<L>   |  22  |  0.70    Ca    9.4      05 Nov 2018 05:55  Phos  3.3     11-05  Mg     1.9     11-05    TPro  7.2  /  Alb  3.9  /  TBili  0.4  /  DBili  x   /  AST  37<H>  /  ALT  49<H>  /  AlkPhos  99  11-05              RADIOLOGY & ADDITIONAL TESTS:    Imaging Personally Reviewed:    Consultant(s) Notes Reviewed:      Care Discussed with Consultants/Other Providers:

## 2018-11-05 NOTE — SWALLOW VFSS/MBS ASSESSMENT ADULT - RECOMMENDED CONSISTENCY
1.) Puree and Thin Liquids.   2.) Feeding/Swallowing Guidelines: Sit upright, puree via teaspoon and thin liquids via cup sips; alternate puree and thin liquids.  3.) Aspiration Precautions  4.) Maintain Good Oral Hygiene Care  5.) Consider ENT Consult to rule out odynophagia at MD's discretion.
1.) Mechanical Soft with Thin Liquids.  2.) Feeding/Swallowing Guidelines: Sit upright, small bites, chew mechanical soft solids well, small single cup sips of Thin Liquids.  3.) Aspiration Precautions  4.) Maintain Good Oral Hygiene Care

## 2018-11-05 NOTE — SWALLOW VFSS/MBS ASSESSMENT ADULT - ORAL PREP COMMENTS
Patient able to strip from teaspoon presentation, oral holding of the bolus with no oral manipulation of the bolus. Anterior loss from the oral cavity on the right side due to reduced lip seal/closure from cup sip self administered.

## 2018-11-05 NOTE — SWALLOW VFSS/MBS ASSESSMENT ADULT - ORAL PHASE COMMENTS
Patient exhibits no tongue motion for anterior to posterior transfer of the bolus; oral holding of the bolus.

## 2018-11-05 NOTE — PROGRESS NOTE ADULT - SUBJECTIVE AND OBJECTIVE BOX
Follow Up:      [place] History/ROS:Patient is a 63y old  Female who presents with a chief complaint of unable to ambulate (05 Nov 2018 08:47)    Afebrile.   Denies pain.      Allergies    No Known Allergies    Intolerances        ANTIMICROBIALS:      OTHER MEDS:  acetaminophen   Tablet .. 650 milliGRAM(s) Oral every 6 hours PRN  aspirin enteric coated 81 milliGRAM(s) Oral daily  atorvastatin 80 milliGRAM(s) Oral at bedtime  dextrose 5% + sodium chloride 0.9%. 1000 milliLiter(s) IV Continuous <Continuous>  docusate sodium 100 milliGRAM(s) Oral three times a day  gabapentin 300 milliGRAM(s) Oral at bedtime  heparin  Injectable 5000 Unit(s) SubCutaneous every 8 hours  influenza   Vaccine 0.5 milliLiter(s) IntraMuscular once  lacosamide IVPB 100 milliGRAM(s) IV Intermittent every 12 hours  lactulose Syrup 10 Gram(s) Oral every 6 hours  NIFEdipine XL 90 milliGRAM(s) Oral daily  ondansetron Injectable 4 milliGRAM(s) IV Push every 8 hours PRN  polyethylene glycol 3350 17 Gram(s) Oral daily  senna 2 Tablet(s) Oral at bedtime  spironolactone 25 milliGRAM(s) Oral daily      Vital Signs Last 24 Hrs  T(C): 37.1 (05 Nov 2018 14:48), Max: 37.2 (05 Nov 2018 06:12)  T(F): 98.8 (05 Nov 2018 14:48), Max: 99 (05 Nov 2018 06:12)  HR: 66 (05 Nov 2018 14:48) (57 - 66)  BP: 133/81 (05 Nov 2018 14:48) (133/81 - 142/75)  BP(mean): --  RR: 18 (05 Nov 2018 14:48) (18 - 18)  SpO2: 100% (05 Nov 2018 14:48) (100% - 100%)    PHYSICAL EXAM:  General: [x ] non-toxic  HEAD/EYES: [ ] PERRL [ x] white sclera [ ] icterus  ENT:  [ ] normal [x ] supple [ ] thrush [ ] pharyngeal exudate  Cardiovascular:   [ ] murmur [x ] normal [ ] PPM/AICD  Respiratory:  [ x] clear to ausculation bilaterally  GI:  x[ ] soft, non-tender, normal bowel sounds  :  [ ] balderas [x ] no CVA tenderness   Musculoskeletal:  [ x] no synovitis  Neurologic:  [x ]aphasia   Skin:  x[ ] no rash  Lymph: [x ] no lymphadenopathy  Psychiatric:  [ ] appropriate affect [ x] alert & oriented  Lines:  [ x] no phlebitis [ ] central line                                13.4   11.10 )-----------( 234      ( 05 Nov 2018 05:55 )             39.1       11-05    139  |  101  |  16  ----------------------------<  100<H>  3.0<L>   |  22  |  0.70    Ca    9.4      05 Nov 2018 05:55  Phos  3.3     11-05  Mg     1.9     11-05    TPro  7.2  /  Alb  3.9  /  TBili  0.4  /  DBili  x   /  AST  37<H>  /  ALT  49<H>  /  AlkPhos  99  11-05          MICROBIOLOGY:    RADIOLOGY:

## 2018-11-05 NOTE — SWALLOW VFSS/MBS ASSESSMENT ADULT - DIAGNOSTIC IMPRESSIONS
Patient presents with Mild to Moderate Oral Stage and Mild Pharyngeal Stage Dysphagia. The Oral Stage is characterized by reduced oral containment with anterior loss on the right side of the oral cavity due to reduced lip seal/closure with cup sip self administered; adequate bolus manipulation and adequate tongue motion with adequate anterior to posterior transfer of the bolus for Thin Liquids; Oral Holding of the Bolus for Puree with adequate tongue to palate seal with no posterior loss/spillage to the hypopharynx.  There was adequate oral clearance post swallow with a Liquid Wash for Thin Liquids to clear the Puree consistency.   The Pharyngeal Stage is characterized by delayed initiation of the pharyngeal swallow (Bolus head is at the laryngeal surface of epiglottis for Thin liquids), adequate laryngeal elevation, adequate tongue base retraction, and adequate pharyngeal constriction. There is adequate pharyngeal clearance post swallow.  There was Trace Laryngeal Penetration during the swallow for Thin Liquids with retrieval and airway protection maintained. There was No Aspiration observed before, during or after the swallow for Puree and Thin Liquid trials. Patient presents with Mild to Moderate Oral Stage and Mild Pharyngeal Stage Dysphagia. The Oral Stage is characterized by reduced oral containment with anterior loss on the right side of the oral cavity due to reduced lip seal/closure with cup sip self administered; adequate bolus manipulation and adequate tongue motion with adequate anterior to posterior transfer of the bolus for Thin Liquids. There was Oral Holding of the Bolus for Puree with no posterior loss/spillage to the hypopharynx with no attempt to orally manipulate and transfer the bolus; however Patient is given Thin Liquids and readily accept and transfer the thin liquid without difficulty.  There was adequate oral clearance post swallow with a Liquid Wash for Thin Liquids to clear the Puree consistency.   The Pharyngeal Stage is characterized by delayed initiation of the pharyngeal swallow (Bolus head is at the laryngeal surface of epiglottis for Thin liquids), adequate laryngeal elevation, adequate tongue base retraction, and adequate pharyngeal constriction. There is adequate pharyngeal clearance post swallow.  There was Trace Laryngeal Penetration during the swallow for Thin Liquids with retrieval and airway protection maintained. There was No Aspiration observed before, during or after the swallow for Puree and Thin Liquid trials. Patient presents with Mild to Moderate Oral Stage and Mild Pharyngeal Stage Dysphagia. The Oral Stage is characterized by reduced oral containment with anterior loss on the right side of the oral cavity due to reduced lip seal/closure with cup sip self administered; adequate bolus manipulation and adequate tongue motion with adequate anterior to posterior transfer of the bolus for Thin Liquids. There was Oral Holding of the Bolus for Puree with no posterior loss/spillage to the hypopharynx with no attempt to orally manipulate and transfer the bolus; however Patient is given Thin Liquids and Patient readily accepts and transfer the thin liquid without difficulty which also assisted to clear the Puree bolus in the oral cavity.  There was adequate oral clearance post swallow with a Liquid Wash to clear the Puree consistency.   The Pharyngeal Stage is characterized by delayed initiation of the pharyngeal swallow (Bolus head is at the laryngeal surface of epiglottis for Thin liquids), adequate laryngeal elevation, adequate tongue base retraction, and adequate pharyngeal constriction. There is adequate pharyngeal clearance post swallow.  There was Trace Laryngeal Penetration during the swallow for Thin Liquids with retrieval and airway protection maintained. There was No Aspiration observed before, during or after the swallow for Puree and Thin Liquid trials.

## 2018-11-06 LAB
BASOPHILS # BLD AUTO: 0.04 K/UL — SIGNIFICANT CHANGE UP (ref 0–0.2)
BASOPHILS NFR BLD AUTO: 0.4 % — SIGNIFICANT CHANGE UP (ref 0–2)
BUN SERPL-MCNC: 7 MG/DL — SIGNIFICANT CHANGE UP (ref 7–23)
CALCIUM SERPL-MCNC: 9.1 MG/DL — SIGNIFICANT CHANGE UP (ref 8.4–10.5)
CHLORIDE SERPL-SCNC: 104 MMOL/L — SIGNIFICANT CHANGE UP (ref 98–107)
CO2 SERPL-SCNC: 23 MMOL/L — SIGNIFICANT CHANGE UP (ref 22–31)
CREAT SERPL-MCNC: 0.67 MG/DL — SIGNIFICANT CHANGE UP (ref 0.5–1.3)
EOSINOPHIL # BLD AUTO: 0.11 K/UL — SIGNIFICANT CHANGE UP (ref 0–0.5)
EOSINOPHIL NFR BLD AUTO: 1 % — SIGNIFICANT CHANGE UP (ref 0–6)
GLUCOSE SERPL-MCNC: 98 MG/DL — SIGNIFICANT CHANGE UP (ref 70–99)
HCT VFR BLD CALC: 38.4 % — SIGNIFICANT CHANGE UP (ref 34.5–45)
HGB BLD-MCNC: 13.4 G/DL — SIGNIFICANT CHANGE UP (ref 11.5–15.5)
IMM GRANULOCYTES # BLD AUTO: 0.06 # — SIGNIFICANT CHANGE UP
IMM GRANULOCYTES NFR BLD AUTO: 0.5 % — SIGNIFICANT CHANGE UP (ref 0–1.5)
LYMPHOCYTES # BLD AUTO: 3.53 K/UL — HIGH (ref 1–3.3)
LYMPHOCYTES # BLD AUTO: 31.6 % — SIGNIFICANT CHANGE UP (ref 13–44)
MAGNESIUM SERPL-MCNC: 1.8 MG/DL — SIGNIFICANT CHANGE UP (ref 1.6–2.6)
MCHC RBC-ENTMCNC: 32.9 PG — SIGNIFICANT CHANGE UP (ref 27–34)
MCHC RBC-ENTMCNC: 34.9 % — SIGNIFICANT CHANGE UP (ref 32–36)
MCV RBC AUTO: 94.3 FL — SIGNIFICANT CHANGE UP (ref 80–100)
MONOCYTES # BLD AUTO: 0.93 K/UL — HIGH (ref 0–0.9)
MONOCYTES NFR BLD AUTO: 8.3 % — SIGNIFICANT CHANGE UP (ref 2–14)
NEUTROPHILS # BLD AUTO: 6.51 K/UL — SIGNIFICANT CHANGE UP (ref 1.8–7.4)
NEUTROPHILS NFR BLD AUTO: 58.2 % — SIGNIFICANT CHANGE UP (ref 43–77)
NRBC # FLD: 0 — SIGNIFICANT CHANGE UP
PHOSPHATE SERPL-MCNC: 3.3 MG/DL — SIGNIFICANT CHANGE UP (ref 2.5–4.5)
PLATELET # BLD AUTO: 243 K/UL — SIGNIFICANT CHANGE UP (ref 150–400)
PMV BLD: 10.3 FL — SIGNIFICANT CHANGE UP (ref 7–13)
POTASSIUM SERPL-MCNC: 3.3 MMOL/L — LOW (ref 3.5–5.3)
POTASSIUM SERPL-SCNC: 3.3 MMOL/L — LOW (ref 3.5–5.3)
RBC # BLD: 4.07 M/UL — SIGNIFICANT CHANGE UP (ref 3.8–5.2)
RBC # FLD: 11.4 % — SIGNIFICANT CHANGE UP (ref 10.3–14.5)
SODIUM SERPL-SCNC: 140 MMOL/L — SIGNIFICANT CHANGE UP (ref 135–145)
WBC # BLD: 11.18 K/UL — HIGH (ref 3.8–10.5)
WBC # FLD AUTO: 11.18 K/UL — HIGH (ref 3.8–10.5)

## 2018-11-06 PROCEDURE — 99232 SBSQ HOSP IP/OBS MODERATE 35: CPT

## 2018-11-06 PROCEDURE — 74177 CT ABD & PELVIS W/CONTRAST: CPT | Mod: 26

## 2018-11-06 RX ORDER — POTASSIUM CHLORIDE 20 MEQ
40 PACKET (EA) ORAL ONCE
Qty: 0 | Refills: 0 | Status: DISCONTINUED | OUTPATIENT
Start: 2018-11-06 | End: 2018-11-06

## 2018-11-06 RX ORDER — ONDANSETRON 8 MG/1
4 TABLET, FILM COATED ORAL THREE TIMES A DAY
Qty: 0 | Refills: 0 | Status: DISCONTINUED | OUTPATIENT
Start: 2018-11-06 | End: 2018-11-07

## 2018-11-06 RX ORDER — POTASSIUM CHLORIDE 20 MEQ
40 PACKET (EA) ORAL ONCE
Qty: 0 | Refills: 0 | Status: COMPLETED | OUTPATIENT
Start: 2018-11-06 | End: 2018-11-06

## 2018-11-06 RX ADMIN — SPIRONOLACTONE 25 MILLIGRAM(S): 25 TABLET, FILM COATED ORAL at 07:07

## 2018-11-06 RX ADMIN — Medication 40 MILLIEQUIVALENT(S): at 11:02

## 2018-11-06 RX ADMIN — ONDANSETRON 4 MILLIGRAM(S): 8 TABLET, FILM COATED ORAL at 10:26

## 2018-11-06 RX ADMIN — HEPARIN SODIUM 5000 UNIT(S): 5000 INJECTION INTRAVENOUS; SUBCUTANEOUS at 14:16

## 2018-11-06 RX ADMIN — HEPARIN SODIUM 5000 UNIT(S): 5000 INJECTION INTRAVENOUS; SUBCUTANEOUS at 05:43

## 2018-11-06 RX ADMIN — POLYETHYLENE GLYCOL 3350 17 GRAM(S): 17 POWDER, FOR SOLUTION ORAL at 11:05

## 2018-11-06 RX ADMIN — LACOSAMIDE 120 MILLIGRAM(S): 50 TABLET ORAL at 05:33

## 2018-11-06 RX ADMIN — LACOSAMIDE 120 MILLIGRAM(S): 50 TABLET ORAL at 19:01

## 2018-11-06 RX ADMIN — HEPARIN SODIUM 5000 UNIT(S): 5000 INJECTION INTRAVENOUS; SUBCUTANEOUS at 23:03

## 2018-11-06 NOTE — PROGRESS NOTE ADULT - PROBLEM SELECTOR PLAN 1
subacute stroke with extensive left carotid dz.   clip was placed 12 years ago in a hospital in [place], unclear if compatible with MRI but will not  at this time. CTA H/N done instead. No vascular intervention  c/w ASA 81mg daily, statin, dysphagia diet  TTE unremarkable, EF: 63%  Awaiting rehab

## 2018-11-06 NOTE — PROGRESS NOTE ADULT - SUBJECTIVE AND OBJECTIVE BOX
Patient is a 63y old  Female who presents with a chief complaint of unable to ambulate (06 Nov 2018 12:57)      SUBJECTIVE / OVERNIGHT EVENTS:    MEDICATIONS  (STANDING):  aspirin enteric coated 81 milliGRAM(s) Oral daily  atorvastatin 80 milliGRAM(s) Oral at bedtime  docusate sodium 100 milliGRAM(s) Oral three times a day  gabapentin 300 milliGRAM(s) Oral at bedtime  heparin  Injectable 5000 Unit(s) SubCutaneous every 8 hours  influenza   Vaccine 0.5 milliLiter(s) IntraMuscular once  lacosamide IVPB 100 milliGRAM(s) IV Intermittent every 12 hours  lactulose Syrup 10 Gram(s) Oral every 6 hours  NIFEdipine XL 90 milliGRAM(s) Oral daily  polyethylene glycol 3350 17 Gram(s) Oral daily  senna 2 Tablet(s) Oral at bedtime  spironolactone 25 milliGRAM(s) Oral daily    MEDICATIONS  (PRN):  acetaminophen   Tablet .. 650 milliGRAM(s) Oral every 6 hours PRN Temp greater or equal to 38C (100.4F), Mild Pain (1 - 3), Moderate Pain (4 - 6)  ondansetron    Tablet 4 milliGRAM(s) Oral every 8 hours PRN Nausea and/or Vomiting  ondansetron Injectable 4 milliGRAM(s) IV Push every 8 hours PRN Nausea and/or Vomiting        CAPILLARY BLOOD GLUCOSE        I&O's Summary    05 Nov 2018 07:01  -  06 Nov 2018 07:00  --------------------------------------------------------  IN: 350 mL / OUT: 0 mL / NET: 350 mL        PHYSICAL EXAM:  Vital Signs Last 24 Hrs  T(C): 37 (06 Nov 2018 14:19), Max: 37.1 (06 Nov 2018 05:41)  T(F): 98.6 (06 Nov 2018 14:19), Max: 98.8 (06 Nov 2018 05:41)  HR: 55 (06 Nov 2018 14:19) (55 - 57)  BP: 128/69 (06 Nov 2018 14:19) (128/69 - 159/75)  BP(mean): --  RR: 18 (06 Nov 2018 14:19) (18 - 18)  SpO2: 99% (06 Nov 2018 14:19) (99% - 100%)  GENERAL: NAD, well-developed  HEAD:  Atraumatic, Normocephalic  EYES: EOMI, PERRLA, conjunctiva and sclera clear  NECK: Supple, No JVD  CHEST/LUNG: Clear to auscultation bilaterally; No wheeze  HEART: Regular rate and rhythm; No murmurs, rubs, or gallops  ABDOMEN: Soft, Nontender, Nondistended; Bowel sounds present  EXTREMITIES:  2+ Peripheral Pulses, No clubbing, cyanosis, or edema  PSYCH: AAOx3  NEUROLOGY: non-focal  SKIN: No rashes or lesions    LABS:                        13.4   11.18 )-----------( 243      ( 06 Nov 2018 06:30 )             38.4     11-06    140  |  104  |  7   ----------------------------<  98  3.3<L>   |  23  |  0.67    Ca    9.1      06 Nov 2018 06:30  Phos  3.3     11-06  Mg     1.8     11-06    TPro  7.2  /  Alb  3.9  /  TBili  0.4  /  DBili  x   /  AST  37<H>  /  ALT  49<H>  /  AlkPhos  99  11-05          RADIOLOGY & ADDITIONAL TESTS:    Imaging Personally Reviewed:    Consultant(s) Notes Reviewed:      Care Discussed with Consultants/Other Providers:

## 2018-11-07 LAB
BASOPHILS # BLD AUTO: 0.06 K/UL — SIGNIFICANT CHANGE UP (ref 0–0.2)
BASOPHILS NFR BLD AUTO: 0.6 % — SIGNIFICANT CHANGE UP (ref 0–2)
BUN SERPL-MCNC: 6 MG/DL — LOW (ref 7–23)
CALCIUM SERPL-MCNC: 9.4 MG/DL — SIGNIFICANT CHANGE UP (ref 8.4–10.5)
CHLORIDE SERPL-SCNC: 103 MMOL/L — SIGNIFICANT CHANGE UP (ref 98–107)
CO2 SERPL-SCNC: 21 MMOL/L — LOW (ref 22–31)
CREAT SERPL-MCNC: 0.68 MG/DL — SIGNIFICANT CHANGE UP (ref 0.5–1.3)
EOSINOPHIL # BLD AUTO: 0.19 K/UL — SIGNIFICANT CHANGE UP (ref 0–0.5)
EOSINOPHIL NFR BLD AUTO: 1.8 % — SIGNIFICANT CHANGE UP (ref 0–6)
GLUCOSE SERPL-MCNC: 85 MG/DL — SIGNIFICANT CHANGE UP (ref 70–99)
HCT VFR BLD CALC: 38.1 % — SIGNIFICANT CHANGE UP (ref 34.5–45)
HCT VFR BLD CALC: 38.1 % — SIGNIFICANT CHANGE UP (ref 34.5–45)
HGB BLD-MCNC: 13 G/DL — SIGNIFICANT CHANGE UP (ref 11.5–15.5)
HGB BLD-MCNC: 13 G/DL — SIGNIFICANT CHANGE UP (ref 11.5–15.5)
IMM GRANULOCYTES # BLD AUTO: 0.05 # — SIGNIFICANT CHANGE UP
IMM GRANULOCYTES NFR BLD AUTO: 0.5 % — SIGNIFICANT CHANGE UP (ref 0–1.5)
LYMPHOCYTES # BLD AUTO: 4.22 K/UL — HIGH (ref 1–3.3)
LYMPHOCYTES # BLD AUTO: 40.1 % — SIGNIFICANT CHANGE UP (ref 13–44)
MCHC RBC-ENTMCNC: 32.9 PG — SIGNIFICANT CHANGE UP (ref 27–34)
MCHC RBC-ENTMCNC: 32.9 PG — SIGNIFICANT CHANGE UP (ref 27–34)
MCHC RBC-ENTMCNC: 34.1 % — SIGNIFICANT CHANGE UP (ref 32–36)
MCHC RBC-ENTMCNC: 34.1 % — SIGNIFICANT CHANGE UP (ref 32–36)
MCV RBC AUTO: 96.5 FL — SIGNIFICANT CHANGE UP (ref 80–100)
MCV RBC AUTO: 96.5 FL — SIGNIFICANT CHANGE UP (ref 80–100)
MONOCYTES # BLD AUTO: 1.03 K/UL — HIGH (ref 0–0.9)
MONOCYTES NFR BLD AUTO: 9.8 % — SIGNIFICANT CHANGE UP (ref 2–14)
NEUTROPHILS # BLD AUTO: 4.97 K/UL — SIGNIFICANT CHANGE UP (ref 1.8–7.4)
NEUTROPHILS NFR BLD AUTO: 47.2 % — SIGNIFICANT CHANGE UP (ref 43–77)
NRBC # FLD: 0 — SIGNIFICANT CHANGE UP
NRBC # FLD: 0 — SIGNIFICANT CHANGE UP
PLATELET # BLD AUTO: 248 K/UL — SIGNIFICANT CHANGE UP (ref 150–400)
PLATELET # BLD AUTO: 248 K/UL — SIGNIFICANT CHANGE UP (ref 150–400)
PMV BLD: 10.5 FL — SIGNIFICANT CHANGE UP (ref 7–13)
PMV BLD: 10.5 FL — SIGNIFICANT CHANGE UP (ref 7–13)
POTASSIUM SERPL-MCNC: 3.6 MMOL/L — SIGNIFICANT CHANGE UP (ref 3.5–5.3)
POTASSIUM SERPL-SCNC: 3.6 MMOL/L — SIGNIFICANT CHANGE UP (ref 3.5–5.3)
RBC # BLD: 3.95 M/UL — SIGNIFICANT CHANGE UP (ref 3.8–5.2)
RBC # BLD: 3.95 M/UL — SIGNIFICANT CHANGE UP (ref 3.8–5.2)
RBC # FLD: 11.4 % — SIGNIFICANT CHANGE UP (ref 10.3–14.5)
RBC # FLD: 11.4 % — SIGNIFICANT CHANGE UP (ref 10.3–14.5)
SODIUM SERPL-SCNC: 140 MMOL/L — SIGNIFICANT CHANGE UP (ref 135–145)
WBC # BLD: 10.52 K/UL — HIGH (ref 3.8–10.5)
WBC # BLD: 10.52 K/UL — HIGH (ref 3.8–10.5)
WBC # FLD AUTO: 10.52 K/UL — HIGH (ref 3.8–10.5)
WBC # FLD AUTO: 10.52 K/UL — HIGH (ref 3.8–10.5)

## 2018-11-07 PROCEDURE — 99232 SBSQ HOSP IP/OBS MODERATE 35: CPT

## 2018-11-07 RX ORDER — ONDANSETRON 8 MG/1
4 TABLET, FILM COATED ORAL THREE TIMES A DAY
Qty: 0 | Refills: 0 | Status: DISCONTINUED | OUTPATIENT
Start: 2018-11-07 | End: 2018-11-08

## 2018-11-07 RX ORDER — LACOSAMIDE 50 MG/1
100 TABLET ORAL
Qty: 0 | Refills: 0 | Status: DISCONTINUED | OUTPATIENT
Start: 2018-11-07 | End: 2018-11-08

## 2018-11-07 RX ORDER — ACETAMINOPHEN 500 MG
650 TABLET ORAL EVERY 6 HOURS
Qty: 0 | Refills: 0 | Status: DISCONTINUED | OUTPATIENT
Start: 2018-11-07 | End: 2018-11-08

## 2018-11-07 RX ORDER — GABAPENTIN 400 MG/1
300 CAPSULE ORAL EVERY 12 HOURS
Qty: 0 | Refills: 0 | Status: DISCONTINUED | OUTPATIENT
Start: 2018-11-07 | End: 2018-11-08

## 2018-11-07 RX ADMIN — HEPARIN SODIUM 5000 UNIT(S): 5000 INJECTION INTRAVENOUS; SUBCUTANEOUS at 05:50

## 2018-11-07 RX ADMIN — HEPARIN SODIUM 5000 UNIT(S): 5000 INJECTION INTRAVENOUS; SUBCUTANEOUS at 23:34

## 2018-11-07 RX ADMIN — SPIRONOLACTONE 25 MILLIGRAM(S): 25 TABLET, FILM COATED ORAL at 05:50

## 2018-11-07 RX ADMIN — LACOSAMIDE 100 MILLIGRAM(S): 50 TABLET ORAL at 19:07

## 2018-11-07 RX ADMIN — HEPARIN SODIUM 5000 UNIT(S): 5000 INJECTION INTRAVENOUS; SUBCUTANEOUS at 13:52

## 2018-11-07 RX ADMIN — GABAPENTIN 300 MILLIGRAM(S): 400 CAPSULE ORAL at 19:07

## 2018-11-07 RX ADMIN — LACOSAMIDE 120 MILLIGRAM(S): 50 TABLET ORAL at 05:44

## 2018-11-07 NOTE — PROVIDER CONTACT NOTE (MEDICATION) - SITUATION
Pt due medications crushed and mixed in apple sauce.  Pt did open mouth to take spoonful then spit it out.  Several attempts made to administer PO medication and pt spits out medications

## 2018-11-07 NOTE — PROGRESS NOTE ADULT - SUBJECTIVE AND OBJECTIVE BOX
Follow Up:      Inverval History/ROS:Patient is a 63y old  Female who presents with a chief complaint of unable to ambulate (07 Nov 2018 10:43)    No fever. No diarrhea.  Intermitent abd pain.        Allergies    No Known Allergies    Intolerances        ANTIMICROBIALS:      OTHER MEDS:  acetaminophen   Tablet .. 650 milliGRAM(s) Oral every 6 hours PRN  acetaminophen  Suppository .. 650 milliGRAM(s) Rectal every 6 hours PRN  aspirin enteric coated 81 milliGRAM(s) Oral daily  atorvastatin 80 milliGRAM(s) Oral at bedtime  cloNIDine Patch 0.1 mG/24Hr(s) 1 patch Transdermal every 7 days  docusate sodium 100 milliGRAM(s) Oral three times a day  gabapentin 300 milliGRAM(s) Oral every 12 hours  heparin  Injectable 5000 Unit(s) SubCutaneous every 8 hours  influenza   Vaccine 0.5 milliLiter(s) IntraMuscular once  lacosamide Solution 100 milliGRAM(s) Oral two times a day  ondansetron   Disintegrating Tablet 4 milliGRAM(s) Oral three times a day  ondansetron Injectable 4 milliGRAM(s) IV Push every 8 hours PRN  polyethylene glycol 3350 17 Gram(s) Oral daily  senna 2 Tablet(s) Oral at bedtime  spironolactone 25 milliGRAM(s) Oral daily      Vital Signs Last 24 Hrs  T(C): 37.2 (07 Nov 2018 14:21), Max: 37.2 (07 Nov 2018 05:21)  T(F): 98.9 (07 Nov 2018 14:21), Max: 98.9 (07 Nov 2018 05:21)  HR: 55 (07 Nov 2018 14:21) (55 - 77)  BP: 152/82 (07 Nov 2018 14:21) (137/71 - 152/82)  BP(mean): --  RR: 18 (07 Nov 2018 14:21) (18 - 18)  SpO2: 100% (07 Nov 2018 14:21) (99% - 100%)    PHYSICAL EXAM:  General: [x ] non-toxic  HEAD/EYES: [ ] PERRL [x ] white sclera [ ] icterus  ENT:  [ ] normal [x ] supple [ ] thrush [ ] pharyngeal exudate  Cardiovascular:   [ ] murmur [ x] normal [ ] PPM/AICD  Respiratory:  [x ] clear to ausculation bilaterally  GI:  [x ] soft, non-tender, normal bowel sounds  :  [ ] balderas [x ] no CVA tenderness   Musculoskeletal:  [ ] no synovitis  Neurologic:  [x ] non-focal exam   Skin:  [ x] no rash  Lymph: [ ] no lymphadenopathy  Psychiatric:  [ ] appropriate affect [x ] alert & oriented  Lines:  [x ] no phlebitis [ ] central line                                13.0   10.52 )-----------( 248      ( 07 Nov 2018 05:30 )             38.1       11-07    140  |  103  |  6<L>  ----------------------------<  85  3.6   |  21<L>  |  0.68    Ca    9.4      07 Nov 2018 05:30  Phos  3.3     11-06  Mg     1.8     11-06            MICROBIOLOGY:    RADIOLOGY:    < from: CT Abdomen and Pelvis w/ IV Cont (11.06.18 @ 18:11) >    IMPRESSION:     *  Mild mucosal wall thickening of the ascending colon and cecum which   can be seen in setting of colitis of infectious or inflammatory etiology.  *  No evidence of bowel obstruction, as clinically questioned.    < end of copied text >

## 2018-11-07 NOTE — PROGRESS NOTE ADULT - SUBJECTIVE AND OBJECTIVE BOX
Patient is a 63y old  Female who presents with a chief complaint of unable to ambulate (07 Nov 2018 10:43)      SUBJECTIVE / OVERNIGHT EVENTS:  Patient notes No pain.  Nurse at bedside notes she kept down her  banana   patient is selective over her food.  Will do Zofran tid before meals since she gets nauseas with meals  MEDICATIONS  (STANDING):  aspirin enteric coated 81 milliGRAM(s) Oral daily  atorvastatin 80 milliGRAM(s) Oral at bedtime  docusate sodium 100 milliGRAM(s) Oral three times a day  gabapentin 300 milliGRAM(s) Oral every 12 hours  heparin  Injectable 5000 Unit(s) SubCutaneous every 8 hours  influenza   Vaccine 0.5 milliLiter(s) IntraMuscular once  lacosamide IVPB 100 milliGRAM(s) IV Intermittent every 12 hours  NIFEdipine XL 90 milliGRAM(s) Oral daily  ondansetron    Tablet 4 milliGRAM(s) Oral three times a day  polyethylene glycol 3350 17 Gram(s) Oral daily  senna 2 Tablet(s) Oral at bedtime  spironolactone 25 milliGRAM(s) Oral daily    MEDICATIONS  (PRN):  acetaminophen   Tablet .. 650 milliGRAM(s) Oral every 6 hours PRN Temp greater or equal to 38C (100.4F), Mild Pain (1 - 3), Moderate Pain (4 - 6)  ondansetron    Tablet 4 milliGRAM(s) Oral every 8 hours PRN Nausea and/or Vomiting  ondansetron Injectable 4 milliGRAM(s) IV Push every 8 hours PRN Nausea and/or Vomiting      PHYSICAL EXAM:  Vital Signs Last 24 Hrs  T(C): 37.2 (07 Nov 2018 05:21), Max: 37.2 (07 Nov 2018 05:21)  T(F): 98.9 (07 Nov 2018 05:21), Max: 98.9 (07 Nov 2018 05:21)  HR: 56 (07 Nov 2018 05:21) (55 - 77)  BP: 147/83 (07 Nov 2018 05:21) (128/69 - 147/83)  BP(mean): --  RR: 18 (07 Nov 2018 05:21) (18 - 18)  SpO2: 99% (07 Nov 2018 05:21) (99% - 99%)  GENERAL: NAD, well-developed  Non verbal but communicate by shaking her head and pointing with L hand  HEAD:  Atraumatic, Normocephalic  EYES: EOMI, PERRLA, conjunctiva and sclera clear  NECK: Supple, No JVD  CHEST/LUNG: Clear to auscultation bilaterally; No wheeze  HEART: Regular rate and rhythm; No murmurs, rubs, or gallops  ABDOMEN: Soft, Nontender, Nondistended; Bowel sounds present  EXTREMITIES:  2+ Peripheral Pulses, No clubbing, cyanosis, or edema  PSYCH: Alert- non verbal  NEUROLOGY: R hemiparesis  SKIN: No rashes or lesions    LABS:                        13.0   10.52 )-----------( 248      ( 07 Nov 2018 05:30 )             38.1     11-07    140  |  103  |  6<L>  ----------------------------<  85  3.6   |  21<L>  |  0.68    Ca    9.4      07 Nov 2018 05:30  Phos  3.3     11-06  Mg     1.8     11-06      RADIOLOGY & ADDITIONAL TESTS:  < from: CT Abdomen and Pelvis w/ IV Cont (11.06.18 @ 18:11) >  IMPRESSION:     *  Mild mucosal wall thickening of the ascending colon and cecum which   can be seen in setting of colitis of infectious or inflammatory etiology.  *  No evidence of bowel obstruction, as clinically questioned.    < end of copied text >      Imaging Personally Reviewed:    Consultant(s) Notes Reviewed:      Care Discussed with Consultants/Other Providers:  Id - d/w Id - No  infection at this time- opt for REHAB

## 2018-11-07 NOTE — PROVIDER CONTACT NOTE (MEDICATION) - SITUATION
Pt given and swallowed Lacosamide oral solution.  Pt given Gabapentin with applesauce, pt vomited up medication.

## 2018-11-07 NOTE — PROGRESS NOTE ADULT - PROBLEM SELECTOR PLAN 1
subacute stroke with extensive left carotid dz.   clip was placed 12 years ago in a hospital in [place], unclear if compatible with MRI but will not  at this time. CTA H/N done instead. No vascular intervention  c/w ASA 81mg daily, statin, dysphagia diet  TTE unremarkable, EF: 63%  Awaiting rehab

## 2018-11-07 NOTE — PROGRESS NOTE ADULT - ATTENDING COMMENTS
Zofran Sl tid before meals- opt for REHAB Zofran SL tid before meals- opt for REHAB.  Called daughter Yamileth [phone] and explained patient has No pain this Am.  Mother is ready for REHAB.  Yamileth wants to speak with  SW before any D/c to Rehab    45 min to coordinate d/c

## 2018-11-08 VITALS
HEART RATE: 52 BPM | OXYGEN SATURATION: 100 % | RESPIRATION RATE: 18 BRPM | TEMPERATURE: 98 F | DIASTOLIC BLOOD PRESSURE: 84 MMHG | SYSTOLIC BLOOD PRESSURE: 158 MMHG

## 2018-11-08 PROCEDURE — 99232 SBSQ HOSP IP/OBS MODERATE 35: CPT

## 2018-11-08 PROCEDURE — 99239 HOSP IP/OBS DSCHRG MGMT >30: CPT

## 2018-11-08 RX ORDER — LACOSAMIDE 50 MG/1
100 TABLET ORAL
Qty: 0 | Refills: 0 | Status: DISCONTINUED | OUTPATIENT
Start: 2018-11-08 | End: 2018-11-08

## 2018-11-08 RX ORDER — SPIRONOLACTONE 25 MG/1
1 TABLET, FILM COATED ORAL
Qty: 0 | Refills: 0 | COMMUNITY

## 2018-11-08 RX ORDER — LACOSAMIDE 50 MG/1
10 TABLET ORAL
Qty: 0 | Refills: 0 | COMMUNITY
Start: 2018-11-08

## 2018-11-08 RX ORDER — SENNA PLUS 8.6 MG/1
2 TABLET ORAL
Qty: 0 | Refills: 0 | COMMUNITY
Start: 2018-11-08

## 2018-11-08 RX ORDER — POLYETHYLENE GLYCOL 3350 17 G/17G
17 POWDER, FOR SOLUTION ORAL
Qty: 0 | Refills: 0 | COMMUNITY
Start: 2018-11-08

## 2018-11-08 RX ORDER — GABAPENTIN 400 MG/1
6 CAPSULE ORAL
Qty: 0 | Refills: 0 | COMMUNITY
Start: 2018-11-08

## 2018-11-08 RX ORDER — ACETAMINOPHEN 500 MG
1 TABLET ORAL
Qty: 0 | Refills: 0 | COMMUNITY
Start: 2018-11-08

## 2018-11-08 RX ORDER — ONDANSETRON 8 MG/1
1 TABLET, FILM COATED ORAL
Qty: 0 | Refills: 0 | COMMUNITY
Start: 2018-11-08

## 2018-11-08 RX ORDER — NIFEDIPINE 30 MG
1 TABLET, EXTENDED RELEASE 24 HR ORAL
Qty: 0 | Refills: 0 | COMMUNITY

## 2018-11-08 RX ORDER — DOCUSATE SODIUM 100 MG
1 CAPSULE ORAL
Qty: 0 | Refills: 0 | COMMUNITY
Start: 2018-11-08

## 2018-11-08 RX ORDER — GABAPENTIN 400 MG/1
300 CAPSULE ORAL EVERY 12 HOURS
Qty: 0 | Refills: 0 | Status: DISCONTINUED | OUTPATIENT
Start: 2018-11-08 | End: 2018-11-08

## 2018-11-08 RX ORDER — LACOSAMIDE 50 MG/1
1 TABLET ORAL
Qty: 0 | Refills: 0 | COMMUNITY

## 2018-11-08 RX ADMIN — Medication 81 MILLIGRAM(S): at 12:45

## 2018-11-08 RX ADMIN — GABAPENTIN 300 MILLIGRAM(S): 400 CAPSULE ORAL at 06:29

## 2018-11-08 RX ADMIN — HEPARIN SODIUM 5000 UNIT(S): 5000 INJECTION INTRAVENOUS; SUBCUTANEOUS at 13:57

## 2018-11-08 RX ADMIN — ONDANSETRON 4 MILLIGRAM(S): 8 TABLET, FILM COATED ORAL at 06:45

## 2018-11-08 RX ADMIN — LACOSAMIDE 100 MILLIGRAM(S): 50 TABLET ORAL at 06:46

## 2018-11-08 RX ADMIN — POLYETHYLENE GLYCOL 3350 17 GRAM(S): 17 POWDER, FOR SOLUTION ORAL at 12:45

## 2018-11-08 RX ADMIN — HEPARIN SODIUM 5000 UNIT(S): 5000 INJECTION INTRAVENOUS; SUBCUTANEOUS at 06:30

## 2018-11-08 RX ADMIN — Medication 1 PATCH: at 12:44

## 2018-11-08 NOTE — PROGRESS NOTE ADULT - PROBLEM SELECTOR PROBLEM 7
Prophylactic measure
Hyponatremia
Prophylactic measure
Prophylactic measure
Hyponatremia
Hyponatremia
Prophylactic measure
Prophylactic measure

## 2018-11-08 NOTE — PROGRESS NOTE ADULT - PROBLEM SELECTOR PLAN 4
DVT: HSQ
DVT: HSQ  Dispo: dw daughter, plan for [facility] - dw CM and SW. awaiting placement. Time spent on DC planing 35 min
DVT: HSQ  Dispo: pending rehab at [facility]  31 min spent coordinating DC. Stable for DC
DVT: HSQ  Dispo: pending rehab at [facility]  31 min spent coordinating DC. Stable for DC
DVT: HSQ  Dispo: pending rehab at [facility]  31 min spent coordinating DC. Stable for DC
NO MORE PAIN.  OBSERVE  Ct of A/p done- id states patient has No CP /sob infections- opt for REHAB
WILL REPEAT ABDOMINAL X RAY TODAY , IF NO CONTRAST -- WILL DO CT ABDOMEN -- HOWEVER LFT IS TRENDING DOWN   F/U WITH GI
DVT: HSQ  Dispo: pending rehab at [facility]  31 min spent coordinating DC. Stable for DC
DVT: HSQ  Dispo: pending rehab at [facility]  31 min spent coordinating DC. Stable for DC
NO MORE PAIN.  OBSERVE  patient is back on dysphagia diet- await Ct of A/p
DVT: HSQ  Dispo: pending rehab at [facility]  31 min spent coordinating DC. Stable for DC
NO MORE PAIN.  OBSERVE  Ct of A/p done- id states patient has No CP /sob infections- opt for REHAB
DVT: HSQ  Dispo: pending rehab at [facility]  31 min spent coordinating DC. Stable for DC
NO MORE PAIN.  OBSERVE  f/u [facility]

## 2018-11-08 NOTE — PROGRESS NOTE ADULT - SUBJECTIVE AND OBJECTIVE BOX
Follow Up:      Inverval History/ROS:Patient is a 63y old  Female who presents with a chief complaint of unable to ambulate (08 Nov 2018 08:46)    Afebrile.   Denies abd pain.      Allergies    No Known Allergies    Intolerances        ANTIMICROBIALS:      OTHER MEDS:  acetaminophen  Suppository .. 650 milliGRAM(s) Rectal every 6 hours PRN  aspirin enteric coated 81 milliGRAM(s) Oral daily  atorvastatin 80 milliGRAM(s) Oral at bedtime  cloNIDine Patch 0.1 mG/24Hr(s) 1 patch Transdermal every 7 days  docusate sodium 100 milliGRAM(s) Oral three times a day  gabapentin   Solution 300 milliGRAM(s) Oral every 12 hours  heparin  Injectable 5000 Unit(s) SubCutaneous every 8 hours  influenza   Vaccine 0.5 milliLiter(s) IntraMuscular once  lacosamide Solution 100 milliGRAM(s) Oral two times a day  ondansetron   Disintegrating Tablet 4 milliGRAM(s) Oral three times a day  ondansetron Injectable 4 milliGRAM(s) IV Push every 8 hours PRN  polyethylene glycol 3350 17 Gram(s) Oral daily  senna 2 Tablet(s) Oral at bedtime  spironolactone 25 milliGRAM(s) Oral daily      Vital Signs Last 24 Hrs  T(C): 36.6 (08 Nov 2018 13:40), Max: 37.1 (08 Nov 2018 05:31)  T(F): 97.9 (08 Nov 2018 13:40), Max: 98.8 (08 Nov 2018 05:31)  HR: 52 (08 Nov 2018 13:40) (52 - 63)  BP: 158/84 (08 Nov 2018 13:40) (127/78 - 164/91)  BP(mean): --  RR: 18 (08 Nov 2018 13:40) (18 - 18)  SpO2: 100% (08 Nov 2018 13:40) (100% - 100%)    PHYSICAL EXAM:  General: [x ] non-toxic  HEAD/EYES: [ ] PERRL [x ] white sclera [ ] icterus  ENT:  [ ] normal [x ] supple [ ] thrush [ ] pharyngeal exudate  Cardiovascular:   [ ] murmur [x ] normal [ ] PPM/AICD  Respiratory:  [x ] clear to ausculation bilaterally  GI:  [ x] soft, non-tender, normal bowel sounds  :  [ ] balderas [ ] no CVA tenderness   Musculoskeletal:  [x ] no synovitis  Neurologic:  [ ] non-focal exam   Skin:  [x ] no rash  Lymph: [x ] no lymphadenopathy  Psychiatric:  [ ] appropriate affect [ ] alert & oriented  Lines:  [x ] no phlebitis [ ] central line                                13.0   10.52 )-----------( 248      ( 07 Nov 2018 05:30 )             38.1       11-07    140  |  103  |  6<L>  ----------------------------<  85  3.6   |  21<L>  |  0.68    Ca    9.4      07 Nov 2018 05:30            MICROBIOLOGY:    RADIOLOGY:

## 2018-11-08 NOTE — PROGRESS NOTE ADULT - PROBLEM SELECTOR PLAN 7
DVT: HSQ
improving   hypokalemia -- replaced
DVT: HSQ
Sec to Nausea and SIADH  IVF hydration and sup of K
Sec to Nausea and SIADH  IVF hydration and sup of K
DVT: HSQ

## 2018-11-08 NOTE — PROGRESS NOTE ADULT - SUBJECTIVE AND OBJECTIVE BOX
Patient is a 63y old  Female who presents with a chief complaint of unable to ambulate (07 Nov 2018 18:56)      SUBJECTIVE / OVERNIGHT EVENTS:    MEDICATIONS  (STANDING):  aspirin enteric coated 81 milliGRAM(s) Oral daily  atorvastatin 80 milliGRAM(s) Oral at bedtime  cloNIDine Patch 0.1 mG/24Hr(s) 1 patch Transdermal every 7 days  docusate sodium 100 milliGRAM(s) Oral three times a day  gabapentin   Solution 300 milliGRAM(s) Oral every 12 hours  heparin  Injectable 5000 Unit(s) SubCutaneous every 8 hours  influenza   Vaccine 0.5 milliLiter(s) IntraMuscular once  lacosamide Solution 100 milliGRAM(s) Oral two times a day  ondansetron   Disintegrating Tablet 4 milliGRAM(s) Oral three times a day  polyethylene glycol 3350 17 Gram(s) Oral daily  senna 2 Tablet(s) Oral at bedtime  spironolactone 25 milliGRAM(s) Oral daily    MEDICATIONS  (PRN):  acetaminophen  Suppository .. 650 milliGRAM(s) Rectal every 6 hours PRN Mild Pain (1 - 3), Moderate Pain (4 - 6), Severe Pain (7 - 10)  ondansetron Injectable 4 milliGRAM(s) IV Push every 8 hours PRN Nausea and/or Vomiting        PHYSICAL EXAM:  Vital Signs Last 24 Hrs  T(C): 37.1 (08 Nov 2018 05:31), Max: 37.2 (07 Nov 2018 14:21)  T(F): 98.8 (08 Nov 2018 05:31), Max: 98.9 (07 Nov 2018 14:21)  HR: 56 (08 Nov 2018 05:31) (55 - 63)  BP: 127/78 (08 Nov 2018 05:31) (127/78 - 164/91)  BP(mean): --  RR: 18 (08 Nov 2018 05:31) (18 - 18)  SpO2: 100% (08 Nov 2018 05:31) (100% - 100%)  GENERAL: NAD, well-developed  HEAD:  Atraumatic, Normocephalic  EYES: EOMI, PERRLA, conjunctiva and sclera clear  NECK: Supple, No JVD  CHEST/LUNG: Clear to auscultation bilaterally; No wheeze  HEART: Regular rate and rhythm; No murmurs, rubs, or gallops  ABDOMEN: Soft, Nontender, Nondistended; Bowel sounds present  EXTREMITIES:  2+ Peripheral Pulses, No clubbing, cyanosis, or edema  R hand contraction  PSYCH: Alert and non verbal  NEUROLOGY: R hemipersis    LABS:                        13.0   10.52 )-----------( 248      ( 07 Nov 2018 05:30 )             38.1     11-07    140  |  103  |  6<L>  ----------------------------<  85  3.6   |  21<L>  |  0.68    Ca    9.4      07 Nov 2018 05:30        RADIOLOGY & ADDITIONAL TESTS:    Imaging Personally Reviewed:    Consultant(s) Notes Reviewed:      Care Discussed with Consultants/Other Providers: Patient is a 63y old  Female who presents with a chief complaint of unable to ambulate (07 Nov 2018 18:56)      SUBJECTIVE / OVERNIGHT EVENTS:  Comfortable.  notes No PAIN.  REady for REHAB    MEDICATIONS  (STANDING):  aspirin enteric coated 81 milliGRAM(s) Oral daily  atorvastatin 80 milliGRAM(s) Oral at bedtime  cloNIDine Patch 0.1 mG/24Hr(s) 1 patch Transdermal every 7 days  docusate sodium 100 milliGRAM(s) Oral three times a day  gabapentin   Solution 300 milliGRAM(s) Oral every 12 hours  heparin  Injectable 5000 Unit(s) SubCutaneous every 8 hours  influenza   Vaccine 0.5 milliLiter(s) IntraMuscular once  lacosamide Solution 100 milliGRAM(s) Oral two times a day  ondansetron   Disintegrating Tablet 4 milliGRAM(s) Oral three times a day  polyethylene glycol 3350 17 Gram(s) Oral daily  senna 2 Tablet(s) Oral at bedtime  spironolactone 25 milliGRAM(s) Oral daily    MEDICATIONS  (PRN):  acetaminophen  Suppository .. 650 milliGRAM(s) Rectal every 6 hours PRN Mild Pain (1 - 3), Moderate Pain (4 - 6), Severe Pain (7 - 10)  ondansetron Injectable 4 milliGRAM(s) IV Push every 8 hours PRN Nausea and/or Vomiting        PHYSICAL EXAM:  Vital Signs Last 24 Hrs  T(C): 37.1 (08 Nov 2018 05:31), Max: 37.2 (07 Nov 2018 14:21)  T(F): 98.8 (08 Nov 2018 05:31), Max: 98.9 (07 Nov 2018 14:21)  HR: 56 (08 Nov 2018 05:31) (55 - 63)  BP: 127/78 (08 Nov 2018 05:31) (127/78 - 164/91)  BP(mean): --  RR: 18 (08 Nov 2018 05:31) (18 - 18)  SpO2: 100% (08 Nov 2018 05:31) (100% - 100%)  GENERAL: NAD, well-developed  HEAD:  Atraumatic, Normocephalic  EYES: EOMI, PERRLA, conjunctiva and sclera clear  NECK: Supple, No JVD  CHEST/LUNG: Clear to auscultation bilaterally; No wheeze  HEART: Regular rate and rhythm; No murmurs, rubs, or gallops  ABDOMEN: Soft, Nontender, Nondistended; Bowel sounds present  EXTREMITIES:  2+ Peripheral Pulses, No clubbing, cyanosis, or edema  R hand contraction  PSYCH: Alert and non verbal  NEUROLOGY: R hemipenis    LABS:                        13.0   10.52 )-----------( 248      ( 07 Nov 2018 05:30 )             38.1     11-07    140  |  103  |  6<L>  ----------------------------<  85  3.6   |  21<L>  |  0.68    Ca    9.4      07 Nov 2018 05:30        RADIOLOGY & ADDITIONAL TESTS:    Imaging Personally Reviewed:    Consultant(s) Notes Reviewed:      Care Discussed with Consultants/Other Providers:

## 2018-11-08 NOTE — PROGRESS NOTE ADULT - REASON FOR ADMISSION
unable to ambulate

## 2018-11-08 NOTE — PROGRESS NOTE ADULT - PROBLEM SELECTOR PLAN 2
controlled, cw, Procardia, and Aldactone.  Stop HCTZ Patient now controlled with Clonidine patch and aldactone

## 2018-11-08 NOTE — PROGRESS NOTE ADULT - PROBLEM SELECTOR PLAN 5
zofran TID before meals to optimize po intake zofran TID before meals to optimize po intake.  Resolved abd pain

## 2018-11-08 NOTE — PROGRESS NOTE ADULT - PROBLEM SELECTOR PLAN 8
R arm neuropathic pain since CVA  Inc Neurontin 300 mg po BID
R arm neuropathic pain since CVA  Start Neurontin 300 mg qhs
R arm neuropathic pain since CVA  Inc Neurontin 300 mg po BID
R arm neuropathic pain since CVA  Start Neurontin 300 mg qhs

## 2018-11-08 NOTE — PROGRESS NOTE ADULT - PROBLEM SELECTOR PROBLEM 2
Hypertension, unspecified type

## 2018-11-08 NOTE — PROGRESS NOTE ADULT - PROBLEM SELECTOR PROBLEM 4
Right lower quadrant abdominal pain
Prophylactic measure
Right lower quadrant abdominal pain
Right lower quadrant abdominal pain
Prophylactic measure
Prophylactic measure
Right lower quadrant abdominal pain
Prophylactic measure
Right lower quadrant abdominal pain
Prophylactic measure

## 2018-11-08 NOTE — PROGRESS NOTE ADULT - PROBLEM SELECTOR PROBLEM 5
Right lower quadrant abdominal pain
Nausea
Right lower quadrant abdominal pain
Nausea
Right lower quadrant abdominal pain
Nausea

## 2018-11-08 NOTE — PROGRESS NOTE ADULT - PROVIDER SPECIALTY LIST ADULT
Hospitalist
Infectious Disease
Infectious Disease
Neurology
Neurology
Rehab Medicine
Rehab Medicine
Infectious Disease
Infectious Disease
Hospitalist

## 2018-11-08 NOTE — PROGRESS NOTE ADULT - NSHPATTENDINGPLANDISCUSS_GEN_ALL_CORE
the primary team
patient/ daughter Yamileth / CM/ Pa
ADS
ADS
patient and daughter Yamileth on phone at bedside [phone]
patient/ daughter/ SW

## 2018-11-08 NOTE — PROGRESS NOTE ADULT - PROBLEM SELECTOR PLAN 1
subacute stroke with extensive left carotid dz.   clip was placed 12 years ago in a hospital in [place], unclear if compatible with MRI but will not  at this time. CTA H/N done instead. No vascular intervention  c/w ASA 81mg daily, statin, dysphagia diet  TTE unremarkable, EF: 63%  Awaiting rehab

## 2018-11-08 NOTE — PROGRESS NOTE ADULT - PROBLEM SELECTOR PROBLEM 3
Seizure

## 2018-11-08 NOTE — PROGRESS NOTE ADULT - ATTENDING COMMENTS
Patient is optimized for REHAB.  Daughter Yamileth aware.  SW will try to get to her choice for REHAB.    45 min to coordinate d/c

## 2018-11-08 NOTE — PROGRESS NOTE ADULT - ASSESSMENT
63 F with  HTN, hemorrhagic CVA s/p craniotomy presented with right sided hemiplegia, aphasia. CTA with extensive left carotid artery dz. Admited for PT evaluation and rehab placement.   She had a leukocytosis to 15 and mild transaminitis, ?abd pain and vomiting, abd u/s showed hepatic steatosis, abd Xray unremarkable  she was started on ceftriaxone 10/30-11/2    At this time, her pain is intermitent and she is afebrile.  She is not having diarrhea.    She does not appear to have an infectious colitis at this time.    Observe off abx.
63 F with  HTN, hemorrhagic CVA s/p craniotomy presented with right sided hemiplegia, aphasia. CTA with extensive left carotid artery dz. Admited for PT evaluation and rehab placement.   She had a leukocytosis to 15 and mild transaminitis, ?abd pain and vomiting, abd u/s showed hepatic steatosis, abd Xray unremarkable  she was started on ceftriaxone 10/30-11/2    Leukocytosis and abd pain have resolved.    Observe off abx.    Will sign off. Call ID service with additional questions.  No evidence of infectious process at this time.
63F with h/o HTN, hemorrhagic cva, brain aneurysm s/p craniotomy with metal plate and clip presented with subacute / chronic L CVA with right sided hemiplegia, aphasia likley due to  extensive left carotid artery dz.   R armand paralysis  RUQ pain with leukocytosis and N/V-
63 F with  HTN, hemorrhagic CVA s/p craniotomy presented with right sided hemiplegia, aphasia. CTA with extensive left carotid artery dz. Admited for PT evaluation and rehab placement.   She had a leukocytosis to 15 and mild transaminitis, ?abd pain and vomiting, abd u/s showed hepatic steatosis, abd Xray unremarkable  she was started on ceftriaxone 10/30-11/2    At this time, her pain is intermitent and she is afebrile.  She is not having diarrhea.    She does not appear to have an infectious colitis at this time.    Observe off abx.    Will sign off.
63 F with  HTN, hemorrhagic CVA s/p craniotomy presented with right sided hemiplegia, aphasia. CTA with extensive left carotid artery dz. Admited for PT evaluation and rehab placement.   She had a leukocytosis to 15 and mild transaminitis, ?abd pain and vomiting, abd u/s showed hepatic steatosis, abd Xray unremarkable  she was started on ceftriaxone 10/30-11/2  now off antibiotics, WBC down to 12, ALT, AST improved  pt denied abd pain  negative hepatitis panel    leukocytosis and transaminitis improving    * f/u the CMV and EBV  * f/u the abd/pelvis CT  * no blood cx was done, if fever or change in the status obtain blood cx
63F with h/o HTN, hemorrhagic cva, brain aneurysm s/p craniotomy with metal plate and clip presented with subacute / chronic L CVA with right sided hemiplegia, aphasia likley due to  extensive left carotid artery dz.
63F with h/o HTN, hemorrhagic cva, brain aneurysm s/p craniotomy with metal plate and clip presented with subacute / chronic L CVA with right sided hemiplegia, aphasia likley due to  extensive left carotid artery dz.   R armand paralysis  RUQ pain with leukocytosis and N/V-
63F with h/o HTN, hemorrhagic cva, brain aneurysm s/p craniotomy with metal plate and clip presented with subacute / chronic L CVA with right sided hemiplegia, aphasia likley due to  extensive left carotid artery dz.   R armand paralysis  RUQ pain with leukocytosis and N/V- will get Ct of A/p
63F with h/o HTN, hemorrhagic cva, brain aneurysm s/p craniotomy with metal plate and clip presented with subacute / chronic L CVA with right sided hemiplegia, aphasia likley due to  extensive left carotid artery dz.   R armand paralysis and nonverbal sec to VCA - 3 months P[place] in [facility] [place]  RUQ pain with leukocytosis - resolved  RN still reports N/V with food but able to keep dfood down as documented by RN- will give Zofran before meals.  R arm neuropathic pain improved with neurontin
63F with h/o HTN, hemorrhagic cva, brain aneurysm s/p craniotomy, metal plate clip presented with subacute / chronic CVA with right sided hemiplegia, aphasia. CTA with extensive left carotid artery dz.
hemiplegia   PT/OT  Work up for abdominal pain as per medicine   PEDRO  .
63F with h/o HTN, hemorrhagic cva, brain aneurysm s/p craniotomy with metal plate and clip presented with subacute / chronic L CVA with right sided hemiplegia, aphasia likley due to  extensive left carotid artery dz.   R armand paralysis  RUQ pain with leukocytosis - reslved.  RN still reports N/V  K 3.3 supplemented
63F with h/o HTN, hemorrhagic cva, brain aneurysm s/p craniotomy with metal plate and clip presented with subacute / chronic L CVA with right sided hemiplegia, aphasia likley due to  extensive left carotid artery dz.   R armand paralysis  RUQ pain with leukocytosis and N/V- will get Ct of A/p  Started Ceftriaxone iv 10/30  Hypokalemia 3.0 k- suplemented  Hyponatremia - sec to Nausea and SIADH, als sec to HCTZ
63F with h/o HTN, hemorrhagic cva, brain aneurysm s/p craniotomy with metal plate and clip presented with subacute / chronic L CVA with right sided hemiplegia, aphasia likley due to  extensive left carotid artery dz.   R armand paralysis  RUQ pain with leukocytosis and N/V- will get Ct of A/p  Started Ceftriaxone iv 10/30  Hypokalemia 3.2 k- suplemented  Hyponatremia - sec to Nausea and SIADH, als sec to HCTZ
63F with h/o HTN, hemorrhagic cva, brain aneurysm s/p craniotomy with metal plate and clip presented with subacute / chronic L CVA with right sided hemiplegia, aphasia likley due to  extensive left carotid artery dz.   RUQ pain with leukocytosis and N/V- will get Ct of A/p  Start Ceftriaxone
63F with h/o HTN, hemorrhagic cva, brain aneurysm s/p craniotomy with metal plate and clip presented with subacute / chronic L CVA with right sided hemiplegia, aphasia likley due to  extensive left carotid artery dz.   R armand paralysis and nonverbal sec to VCA - 3 months PTA in [facility] [place]  RUQ pain with leukocytosis - resolved  RN still reports N/V with food but able to keep dfood down as documented by RN- will give Zofran before meals.  R arm neuropathic pain improved with neurontin
63F with h/o HTN, hemorrhagic cva, brain aneurysm s/p craniotomy with metal plate and clip presented with subacute / chronic L CVA with right sided hemiplegia, aphasia likley due to  extensive left carotid artery dz.   R armand paralysis  RUQ pain with leukocytosis and N/V- will get Ct of A/p  Started Ceftriaxone iv 10/30  Hypokalemia 3.1 k- suplemented

## 2018-11-08 NOTE — PROGRESS NOTE ADULT - PROBLEM SELECTOR PROBLEM 1
Cerebrovascular accident (CVA), unspecified mechanism

## 2018-11-09 NOTE — H&P ADULT - PMH
can you learn more? Go to [email or web address] and sign in to your [employer] account. Enter W306 in the [employer] box to learn more about \"Left Heart Catheterization: About This Test.\"     If you do not have an account, please click on the \"Sign Up Now\" link. Current as of: December 6, 2017  Content Version: 11.8  © 4688-4027 Healthwise, Incorporated. Care instructions adapted under license by [facility] ([facility]). If you have questions about a medical condition or this instruction, always ask your healthcare professional. [address] any warranty or liability for your use of this information. Cerebral aneurysm rupture    Hemorrhagic stroke    Hypertension, unspecified type    Seizure

## 2019-04-26 PROBLEM — R56.9 UNSPECIFIED CONVULSIONS: Chronic | Status: ACTIVE | Noted: 2018-10-23

## 2019-04-26 PROBLEM — I10 ESSENTIAL (PRIMARY) HYPERTENSION: Chronic | Status: ACTIVE | Noted: 2018-10-23

## 2019-04-26 PROBLEM — I60.9 NONTRAUMATIC SUBARACHNOID HEMORRHAGE, UNSPECIFIED: Chronic | Status: ACTIVE | Noted: 2018-10-23

## 2019-04-26 PROBLEM — I61.9 NONTRAUMATIC INTRACEREBRAL HEMORRHAGE, UNSPECIFIED: Chronic | Status: ACTIVE | Noted: 2018-10-23

## 2019-05-03 ENCOUNTER — APPOINTMENT (OUTPATIENT)
Dept: NEUROLOGY | Facility: CLINIC | Age: 64
End: 2019-05-03
Payer: MEDICAID

## 2019-05-03 VITALS
HEART RATE: 44 BPM | HEIGHT: 62 IN | WEIGHT: 174 LBS | SYSTOLIC BLOOD PRESSURE: 154 MMHG | DIASTOLIC BLOOD PRESSURE: 97 MMHG | BODY MASS INDEX: 32.02 KG/M2

## 2019-05-03 DIAGNOSIS — Z91.89 OTHER SPECIFIED PERSONAL RISK FACTORS, NOT ELSEWHERE CLASSIFIED: ICD-10-CM

## 2019-05-03 DIAGNOSIS — I63.032: ICD-10-CM

## 2019-05-03 PROCEDURE — 99214 OFFICE O/P EST MOD 30 MIN: CPT

## 2019-05-03 RX ORDER — ZONISAMIDE 100 MG/1
100 CAPSULE ORAL
Qty: 90 | Refills: 5 | Status: ACTIVE | COMMUNITY
Start: 2019-05-03 | End: 1900-01-01

## 2019-05-03 NOTE — HISTORY OF PRESENT ILLNESS
[FreeTextEntry1] : 62 yo woman, accompanied by her caretaker from [facility].  She is Kyrgyz speaking, although she now has an expressive aphasia.\par \par She was admitted to [facility] in October 2018 shortly after [place] in the [place] from [place]  in a wheelchair, brought in by her daughter.  She had a left hemispheric stroke associated with a residual right hemiplegia and expressive aphasia. CTA demonstrated occlusion of the left ICA.   12 years prior to this she had a right craniotomy for surgical clipping of an intracerebral aneurysm, associated with a right frontal hemorrhagic stroke which she had recovered from.  \par \par She is on seizure prophylaxis with Vimpat 100mg PO BID, however, it is not covered by her insurance, and her medical team would like to switch to an alternative AED.  She has mood swings and irritability, so Keppra may not be appropriate.   Spoke with her RN at [facility], and it is uncertain as to if and when she had seizures in the past.\par \par Stroke risk factors include HTN and hyperlipidemia.\par \par She takes Gabapentin 300mg TID for neuropathic pain (central?).

## 2019-05-03 NOTE — ASSESSMENT
[FreeTextEntry1] : 62 yo woman with severe cerebrovascular disease associated with a ruptured right parasellar aneurysm (with a right frontal hemorrhagic stroke) and completed occlusion of the left ICA (with a left frontal infarct).  Residual right hemiplegia and expressive aphasia.\par \par On seizure prophylaxis with Vimpat, which needs to be changed to an alternative due to insurance coverage.  Keppra may not be appropriate as patient has irritabilty and mood swings which can be worsened by Keppra.\par Lamotrigine may be more challenging to titrate, as opposed to an alternative such as Zonegran (Zonisamide).\par \par Plan:\par 1. Stroke/Vascular neurology consult for recommendations regarding secondary stroke prevention\par 2. Transition from Vimpat to Zonegran as follows:\par Instructions for Zonegran (Zonisamide 100mg capsules):\par Week 1: Take 1 capsule (100mg) at night\par Week 2: Take 2 capsules (200mg) at night\par Week 3: Take 3 capsules (300mg) at night, and continue this dose.\par \par Instructions for Vimpat (Lacosamide 100mg tablets):\par Week 1: Take 1 tablet (100mg) once a day\par Week 2: STOP  \par \par 3. Patient agrees with plan.\par 4. F/U in 1 month\par \par Roberto William MD\par [facility]\par \par Time Spent: 60 minutes taking history, performing examination, and counseling on diagnosis and management.\par

## 2019-05-03 NOTE — PHYSICAL EXAM
[FreeTextEntry1] : Awake, alert, smiles, appears pleasant.  +expressive aphasia.   Comprehension: follows simple 1-step commands, does not repeat.   Affect normal.  \par Cranial nerves: right central facial paresis.  \par Motor exam: +right spastic hemiplegia.   \par Sensory exam: unable to assess.  \par DTRs: 3+  RUE/RLE, 2+ LUE/LLE.  \par Coordination: no dysmetria on left.  \par Gait: wheelchair bound.  \par

## 2019-06-17 ENCOUNTER — APPOINTMENT (OUTPATIENT)
Dept: NEUROLOGY | Facility: CLINIC | Age: 64
End: 2019-06-17

## 2019-09-25 NOTE — ED ADULT NURSE NOTE - NSSISCREENINGQ5_ED_A_ED
Pt. Presents to ED today for complaints of black stools that started yesterday. Pt. Has a history of GI issues. Daughter reports that patient has a \"leaky mitral valve and poor kidney function. \" Pt. Also has a history of iron infusions and blood transfusions. Pt. Denies any nausea or vomiting. Upon arrival patient alert and oriented x4. PT. Placed in position of comfort with call bell in reach.
No

## 2021-03-17 NOTE — SWALLOW VFSS/MBS ASSESSMENT ADULT - COMMENTS
March 18, 2021      Shreya Sam  [address]  [facility] [facility] [address]        Dear parents of Shreya Sam,     Shreya 's lab results show that she has no anemia with a normal hemoglobin and her Vit D, although improved, is still low and she would benefit from a daily dose of  over the counter 2,000UI of Vit D .  Please don't hesitate to contact me if you have any questions.     Sincerely,   Rebeka Alvarez MD   [phone]     Resulted Orders   Hemoglobin   Result Value Ref Range    Hemoglobin 13.1 11.7 - 15.7 g/dL   Vitamin D Deficiency   Result Value Ref Range    Vitamin D Deficiency screening 19 (L) 20 - 75 ug/L      Comment:      Season, race, dietary intake, and treatment affect the concentration of   25-hydroxy-Vitamin D. Values may decrease during winter months and increase   during summer months. Values 20-29 ug/L may indicate Vitamin D insufficiency   and values <20 ug/L may indicate Vitamin D deficiency.  Vitamin D determination is routinely performed by an immunoassay specific for   25 hydroxyvitamin D3.  If an individual is on vitamin D2 (ergocalciferol)   supplementation, please specify 25 OH vitamin D2 and D3 level determination by   LCMSMS test [id number].                         
SPORTS CLEARANCE - [employer]    Shreya Sam    Telephone: [phone] (home)  [address]  [facility] [address]  YOB: 2006   14 year old female        Sports: soccer    I certify that the above student has been medically evaluated and is deemed to be physically fit to participate in school interscholastic activities as indicated below.    Participation Clearance For:   Collision Sports, YES  Limited Contact Sports, YES  Noncontact Sports, YES          Date of physical exam: 3/17/2021        _______________________________________________  Attending Provider Signature     3/17/2021      Rebeka Alvarez MD      Valid for 3 years from above date with a normal Annual Health Questionnaire (all NO responses)     Year 2     Year 3      A sports clearance letter meets the [employer] requirements for sports participation.  If there are concerns about this policy please call [employer] administration office directly at [phone].    
63y f w PMHx strokes in the past p/w R sided paralysis s/p stroke. Pt had several stroke 15yrs ago and was apparently diagnosed w/ a brain aneurysm, which was stable for many years. She has been out of the country for the last year and 3 months ago had another stroke, which left her paralysed on the R upper and lower extremities and with R sided facial paralysis. No left sided deficits. She arrived in the [place] today and her daughter [place] her up from airport, not expecting the extent of neurological deficits. The pt was immediately brought to the ED by her daughter for evaluation.     63F with h/o HTN, hemorrhagic cva, brain aneurysm s/p craniotomy, metal plate clip presented with subacute / chronic CVA with right sided hemiplegia, aphasia. CTA with extensive left carotid artery dz.    Patient was seen for a Clinical swallow Eval on 10/24/2018 (See Consult).
63F with h/o HTN, hemorrhagic cva, brain aneurysm s/p craniotomy with metal plate and clip presented with subacute / chronic L CVA with right sided hemiplegia, aphasia likley due to  extensive left carotid artery dz.     Of Note: Patient had a repeat Clinical Swallow Eval on 11/3/2018; initial Cinesophagram on 10/25/2018 and an initial Clinical Swallow Eval on 10/24/2018 (See Consults and Cinesophagram Report for details).

## 2022-06-03 NOTE — SWALLOW VFSS/MBS ASSESSMENT ADULT - ADDITIONAL RECOMMENDATIONS
Patient may benefit speech/swallow therapy pending discharge plans (e.g. Rehab Center vs Home Care vs OutPatient at [facility] Speech/Swallow [facility] [phone]).
PROVIDER:[TOKEN:[[id number]],FOLLOWUP:[1-3 Days],ESTABLISHEDPATIENT:[T]]
Patient may benefit speech therapy pending discharge plans (e.g. Rehab Center vs Home Care vs OutPatient at [facility] Speech/Swallow [facility] [phone].

## 2022-10-14 NOTE — H&P ADULT - NSHPOUTPATIENTPROVIDERS_GEN_ALL_CORE
Neurologist Dr. Winkler (at [facility]) Previously Declined (within the last year)

## 2024-02-09 NOTE — PROGRESS NOTE ADULT - PROBLEM SELECTOR PLAN 1
subacute stroke with extensive left carotid dz.   clip was placed 12 years ago in a hospital in [place], unclear if compatible with MRI but will not  at this time. CTA H/N done instead. No vascular intervention  c/w ASA 81mg daily, statin, dysphagia diet  TTE unremarkable, EF: 63%  Awaiting rehab not at present time